# Patient Record
Sex: MALE | Race: WHITE | HISPANIC OR LATINO | Employment: OTHER | URBAN - METROPOLITAN AREA
[De-identification: names, ages, dates, MRNs, and addresses within clinical notes are randomized per-mention and may not be internally consistent; named-entity substitution may affect disease eponyms.]

---

## 2017-11-27 ENCOUNTER — OFFICE VISIT (OUTPATIENT)
Dept: URGENT CARE | Age: 67
End: 2017-11-27
Payer: COMMERCIAL

## 2017-11-27 ENCOUNTER — APPOINTMENT (OUTPATIENT)
Dept: RADIOLOGY | Age: 67
End: 2017-11-27
Payer: COMMERCIAL

## 2017-11-27 ENCOUNTER — TRANSCRIBE ORDERS (OUTPATIENT)
Dept: URGENT CARE | Age: 67
End: 2017-11-27

## 2017-11-27 DIAGNOSIS — J06.9 ACUTE UPPER RESPIRATORY INFECTION: ICD-10-CM

## 2017-11-27 DIAGNOSIS — E78.5 HYPERLIPIDEMIA: ICD-10-CM

## 2017-11-27 DIAGNOSIS — I10 ESSENTIAL (PRIMARY) HYPERTENSION: ICD-10-CM

## 2017-11-27 DIAGNOSIS — F02.80 ALZHEIMER'S DISEASE (HCC): ICD-10-CM

## 2017-11-27 DIAGNOSIS — R22.1 LOCALIZED SWELLING, MASS OR LUMP OF NECK: ICD-10-CM

## 2017-11-27 DIAGNOSIS — G81.91 HEMIPLEGIA AFFECTING RIGHT DOMINANT SIDE (HCC): ICD-10-CM

## 2017-11-27 DIAGNOSIS — E11.9 TYPE 2 DIABETES MELLITUS WITHOUT COMPLICATIONS (HCC): ICD-10-CM

## 2017-11-27 DIAGNOSIS — G30.9 ALZHEIMER'S DISEASE (HCC): ICD-10-CM

## 2017-11-27 DIAGNOSIS — F80.1 EXPRESSIVE LANGUAGE DISORDER: ICD-10-CM

## 2017-11-27 PROCEDURE — 99214 OFFICE O/P EST MOD 30 MIN: CPT | Performed by: FAMILY MEDICINE

## 2017-11-27 PROCEDURE — 71020 HB CHEST X-RAY 2VW FRONTAL&LATL: CPT

## 2017-11-27 PROCEDURE — G0463 HOSPITAL OUTPT CLINIC VISIT: HCPCS | Performed by: FAMILY MEDICINE

## 2017-11-28 ENCOUNTER — ALLSCRIPTS OFFICE VISIT (OUTPATIENT)
Dept: OTHER | Facility: OTHER | Age: 67
End: 2017-11-28

## 2017-11-29 ENCOUNTER — TRANSCRIBE ORDERS (OUTPATIENT)
Dept: ADMINISTRATIVE | Age: 67
End: 2017-11-29

## 2017-11-29 ENCOUNTER — APPOINTMENT (OUTPATIENT)
Dept: LAB | Age: 67
End: 2017-11-29
Payer: COMMERCIAL

## 2017-11-29 DIAGNOSIS — E11.9 TYPE 2 DIABETES MELLITUS WITHOUT COMPLICATIONS (HCC): ICD-10-CM

## 2017-11-29 DIAGNOSIS — G30.9 ALZHEIMER'S DISEASE (HCC): ICD-10-CM

## 2017-11-29 DIAGNOSIS — I10 ESSENTIAL (PRIMARY) HYPERTENSION: ICD-10-CM

## 2017-11-29 DIAGNOSIS — F02.80 ALZHEIMER'S DISEASE (HCC): ICD-10-CM

## 2017-11-29 DIAGNOSIS — E78.5 HYPERLIPIDEMIA: ICD-10-CM

## 2017-11-29 LAB
ALBUMIN SERPL BCP-MCNC: 3.7 G/DL (ref 3.5–5)
ALP SERPL-CCNC: 52 U/L (ref 46–116)
ALT SERPL W P-5'-P-CCNC: 21 U/L (ref 12–78)
ANION GAP SERPL CALCULATED.3IONS-SCNC: 6 MMOL/L (ref 4–13)
AST SERPL W P-5'-P-CCNC: 20 U/L (ref 5–45)
BASOPHILS # BLD AUTO: 0.01 THOUSANDS/ΜL (ref 0–0.1)
BASOPHILS NFR BLD AUTO: 0 % (ref 0–1)
BILIRUB SERPL-MCNC: 1.21 MG/DL (ref 0.2–1)
BUN SERPL-MCNC: 23 MG/DL (ref 5–25)
CALCIUM SERPL-MCNC: 9.2 MG/DL (ref 8.3–10.1)
CHLORIDE SERPL-SCNC: 105 MMOL/L (ref 100–108)
CHOLEST SERPL-MCNC: 99 MG/DL (ref 50–200)
CO2 SERPL-SCNC: 29 MMOL/L (ref 21–32)
CREAT SERPL-MCNC: 0.75 MG/DL (ref 0.6–1.3)
CREAT UR-MCNC: 190 MG/DL
EOSINOPHIL # BLD AUTO: 0.02 THOUSAND/ΜL (ref 0–0.61)
EOSINOPHIL NFR BLD AUTO: 0 % (ref 0–6)
ERYTHROCYTE [DISTWIDTH] IN BLOOD BY AUTOMATED COUNT: 13.5 % (ref 11.6–15.1)
EST. AVERAGE GLUCOSE BLD GHB EST-MCNC: 91 MG/DL
FOLATE SERPL-MCNC: >20 NG/ML (ref 3.1–17.5)
GFR SERPL CREATININE-BSD FRML MDRD: 94 ML/MIN/1.73SQ M
GLUCOSE P FAST SERPL-MCNC: 68 MG/DL (ref 65–99)
HBA1C MFR BLD: 4.8 % (ref 4.2–6.3)
HCT VFR BLD AUTO: 41.4 % (ref 36.5–49.3)
HDLC SERPL-MCNC: 41 MG/DL (ref 40–60)
HGB BLD-MCNC: 13.8 G/DL (ref 12–17)
LDLC SERPL CALC-MCNC: 40 MG/DL (ref 0–100)
LYMPHOCYTES # BLD AUTO: 0.75 THOUSANDS/ΜL (ref 0.6–4.47)
LYMPHOCYTES NFR BLD AUTO: 16 % (ref 14–44)
MCH RBC QN AUTO: 30.6 PG (ref 26.8–34.3)
MCHC RBC AUTO-ENTMCNC: 33.3 G/DL (ref 31.4–37.4)
MCV RBC AUTO: 92 FL (ref 82–98)
MICROALBUMIN UR-MCNC: 21.4 MG/L (ref 0–20)
MICROALBUMIN/CREAT 24H UR: 11 MG/G CREATININE (ref 0–30)
MONOCYTES # BLD AUTO: 0.67 THOUSAND/ΜL (ref 0.17–1.22)
MONOCYTES NFR BLD AUTO: 14 % (ref 4–12)
NEUTROPHILS # BLD AUTO: 3.18 THOUSANDS/ΜL (ref 1.85–7.62)
NEUTS SEG NFR BLD AUTO: 70 % (ref 43–75)
NRBC BLD AUTO-RTO: 0 /100 WBCS
PLATELET # BLD AUTO: 197 THOUSANDS/UL (ref 149–390)
PMV BLD AUTO: 10.8 FL (ref 8.9–12.7)
POTASSIUM SERPL-SCNC: 4 MMOL/L (ref 3.5–5.3)
PROT SERPL-MCNC: 7.1 G/DL (ref 6.4–8.2)
RBC # BLD AUTO: 4.51 MILLION/UL (ref 3.88–5.62)
SODIUM SERPL-SCNC: 140 MMOL/L (ref 136–145)
TRIGL SERPL-MCNC: 88 MG/DL
TSH SERPL DL<=0.05 MIU/L-ACNC: 1.54 UIU/ML (ref 0.36–3.74)
VIT B12 SERPL-MCNC: 937 PG/ML (ref 100–900)
WBC # BLD AUTO: 4.64 THOUSAND/UL (ref 4.31–10.16)

## 2017-11-29 PROCEDURE — 82746 ASSAY OF FOLIC ACID SERUM: CPT

## 2017-11-29 PROCEDURE — 82043 UR ALBUMIN QUANTITATIVE: CPT

## 2017-11-29 PROCEDURE — 84443 ASSAY THYROID STIM HORMONE: CPT

## 2017-11-29 PROCEDURE — 80053 COMPREHEN METABOLIC PANEL: CPT

## 2017-11-29 PROCEDURE — 85025 COMPLETE CBC W/AUTO DIFF WBC: CPT

## 2017-11-29 PROCEDURE — 82570 ASSAY OF URINE CREATININE: CPT

## 2017-11-29 PROCEDURE — 36415 COLL VENOUS BLD VENIPUNCTURE: CPT

## 2017-11-29 PROCEDURE — 82607 VITAMIN B-12: CPT

## 2017-11-29 PROCEDURE — 80061 LIPID PANEL: CPT

## 2017-11-29 PROCEDURE — 83036 HEMOGLOBIN GLYCOSYLATED A1C: CPT
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
## 2017-12-01 ENCOUNTER — GENERIC CONVERSION - ENCOUNTER (OUTPATIENT)
Dept: OTHER | Facility: OTHER | Age: 67
End: 2017-12-01
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
## 2017-12-07 ENCOUNTER — ALLSCRIPTS OFFICE VISIT (OUTPATIENT)
Dept: OTHER | Facility: OTHER | Age: 67
End: 2017-12-07

## 2017-12-13 ENCOUNTER — APPOINTMENT (OUTPATIENT)
Dept: PHYSICAL THERAPY | Facility: CLINIC | Age: 67
End: 2017-12-13
Payer: COMMERCIAL

## 2017-12-13 DIAGNOSIS — G81.91 HEMIPLEGIA AFFECTING RIGHT DOMINANT SIDE (HCC): ICD-10-CM

## 2017-12-13 PROCEDURE — G8979 MOBILITY GOAL STATUS: HCPCS

## 2017-12-13 PROCEDURE — G8978 MOBILITY CURRENT STATUS: HCPCS

## 2017-12-13 PROCEDURE — 97163 PT EVAL HIGH COMPLEX 45 MIN: CPT

## 2017-12-14 ENCOUNTER — APPOINTMENT (OUTPATIENT)
Dept: SPEECH THERAPY | Facility: CLINIC | Age: 67
End: 2017-12-14
Payer: COMMERCIAL

## 2017-12-15 ENCOUNTER — GENERIC CONVERSION - ENCOUNTER (OUTPATIENT)
Dept: INTERNAL MEDICINE CLINIC | Facility: CLINIC | Age: 67
End: 2017-12-15

## 2017-12-18 ENCOUNTER — GENERIC CONVERSION - ENCOUNTER (OUTPATIENT)
Dept: INTERNAL MEDICINE CLINIC | Facility: CLINIC | Age: 67
End: 2017-12-18

## 2017-12-19 ENCOUNTER — HOSPITAL ENCOUNTER (OUTPATIENT)
Dept: RADIOLOGY | Age: 67
Discharge: HOME | End: 2017-12-19
Payer: COMMERCIAL

## 2017-12-19 ENCOUNTER — ALLSCRIPTS OFFICE VISIT (OUTPATIENT)
Dept: OTHER | Facility: OTHER | Age: 67
End: 2017-12-19

## 2017-12-19 DIAGNOSIS — R22.1 LOCALIZED SWELLING, MASS OR LUMP OF NECK: ICD-10-CM

## 2017-12-19 PROCEDURE — 76705 ECHO EXAM OF ABDOMEN: CPT

## 2017-12-20 ENCOUNTER — GENERIC CONVERSION - ENCOUNTER (OUTPATIENT)
Dept: OTHER | Facility: OTHER | Age: 67
End: 2017-12-20

## 2017-12-20 ENCOUNTER — APPOINTMENT (OUTPATIENT)
Dept: PHYSICAL THERAPY | Facility: CLINIC | Age: 67
End: 2017-12-20
Payer: COMMERCIAL

## 2017-12-20 PROCEDURE — 97110 THERAPEUTIC EXERCISES: CPT

## 2017-12-20 PROCEDURE — 97112 NEUROMUSCULAR REEDUCATION: CPT

## 2017-12-21 ENCOUNTER — APPOINTMENT (OUTPATIENT)
Dept: PHYSICAL THERAPY | Facility: CLINIC | Age: 67
End: 2017-12-21
Payer: COMMERCIAL

## 2017-12-21 ENCOUNTER — GENERIC CONVERSION - ENCOUNTER (OUTPATIENT)
Dept: OTHER | Facility: OTHER | Age: 67
End: 2017-12-21

## 2017-12-21 PROCEDURE — 97110 THERAPEUTIC EXERCISES: CPT

## 2017-12-21 PROCEDURE — 97112 NEUROMUSCULAR REEDUCATION: CPT

## 2017-12-21 PROCEDURE — 97116 GAIT TRAINING THERAPY: CPT

## 2017-12-26 ENCOUNTER — APPOINTMENT (OUTPATIENT)
Dept: PHYSICAL THERAPY | Facility: CLINIC | Age: 67
End: 2017-12-26
Payer: COMMERCIAL

## 2017-12-26 ENCOUNTER — APPOINTMENT (OUTPATIENT)
Dept: OCCUPATIONAL THERAPY | Facility: CLINIC | Age: 67
End: 2017-12-26
Payer: COMMERCIAL

## 2017-12-26 PROCEDURE — 97112 NEUROMUSCULAR REEDUCATION: CPT

## 2017-12-26 PROCEDURE — 97110 THERAPEUTIC EXERCISES: CPT

## 2017-12-28 ENCOUNTER — APPOINTMENT (OUTPATIENT)
Dept: PHYSICAL THERAPY | Facility: CLINIC | Age: 67
End: 2017-12-28
Payer: COMMERCIAL

## 2017-12-28 ENCOUNTER — APPOINTMENT (OUTPATIENT)
Dept: OCCUPATIONAL THERAPY | Facility: CLINIC | Age: 67
End: 2017-12-28
Payer: COMMERCIAL

## 2017-12-28 PROCEDURE — 97112 NEUROMUSCULAR REEDUCATION: CPT

## 2017-12-28 PROCEDURE — 97110 THERAPEUTIC EXERCISES: CPT

## 2018-01-04 ENCOUNTER — APPOINTMENT (OUTPATIENT)
Dept: OCCUPATIONAL THERAPY | Facility: CLINIC | Age: 68
End: 2018-01-04
Payer: COMMERCIAL

## 2018-01-04 ENCOUNTER — APPOINTMENT (OUTPATIENT)
Dept: PHYSICAL THERAPY | Facility: CLINIC | Age: 68
End: 2018-01-04
Payer: COMMERCIAL

## 2018-01-04 ENCOUNTER — APPOINTMENT (OUTPATIENT)
Dept: SPEECH THERAPY | Facility: CLINIC | Age: 68
End: 2018-01-04
Payer: COMMERCIAL

## 2018-01-04 PROCEDURE — G8984 CARRY CURRENT STATUS: HCPCS

## 2018-01-04 PROCEDURE — 97167 OT EVAL HIGH COMPLEX 60 MIN: CPT

## 2018-01-04 PROCEDURE — G9163 LANG EXPRESS GOAL STATUS: HCPCS

## 2018-01-04 PROCEDURE — 92610 EVALUATE SWALLOWING FUNCTION: CPT

## 2018-01-04 PROCEDURE — G8985 CARRY GOAL STATUS: HCPCS

## 2018-01-04 PROCEDURE — G9162 LANG EXPRESS CURRENT STATUS: HCPCS

## 2018-01-04 PROCEDURE — 96105 ASSESSMENT OF APHASIA: CPT

## 2018-01-04 PROCEDURE — 97112 NEUROMUSCULAR REEDUCATION: CPT

## 2018-01-09 ENCOUNTER — GENERIC CONVERSION - ENCOUNTER (OUTPATIENT)
Dept: OTHER | Facility: OTHER | Age: 68
End: 2018-01-09

## 2018-01-09 ENCOUNTER — APPOINTMENT (OUTPATIENT)
Dept: PHYSICAL THERAPY | Facility: CLINIC | Age: 68
End: 2018-01-09
Payer: COMMERCIAL

## 2018-01-09 ENCOUNTER — APPOINTMENT (OUTPATIENT)
Dept: SPEECH THERAPY | Facility: CLINIC | Age: 68
End: 2018-01-09
Payer: COMMERCIAL

## 2018-01-09 ENCOUNTER — GENERIC CONVERSION - ENCOUNTER (OUTPATIENT)
Dept: INTERNAL MEDICINE CLINIC | Facility: CLINIC | Age: 68
End: 2018-01-09

## 2018-01-09 PROCEDURE — 97164 PT RE-EVAL EST PLAN CARE: CPT

## 2018-01-09 PROCEDURE — 92507 TX SP LANG VOICE COMM INDIV: CPT

## 2018-01-09 PROCEDURE — G8979 MOBILITY GOAL STATUS: HCPCS

## 2018-01-09 PROCEDURE — G8978 MOBILITY CURRENT STATUS: HCPCS

## 2018-01-11 ENCOUNTER — APPOINTMENT (OUTPATIENT)
Dept: PHYSICAL THERAPY | Facility: CLINIC | Age: 68
End: 2018-01-11
Payer: COMMERCIAL

## 2018-01-11 ENCOUNTER — APPOINTMENT (OUTPATIENT)
Dept: SPEECH THERAPY | Facility: CLINIC | Age: 68
End: 2018-01-11
Payer: COMMERCIAL

## 2018-01-11 PROCEDURE — 97112 NEUROMUSCULAR REEDUCATION: CPT

## 2018-01-11 PROCEDURE — 92526 ORAL FUNCTION THERAPY: CPT

## 2018-01-11 PROCEDURE — 92507 TX SP LANG VOICE COMM INDIV: CPT

## 2018-01-11 NOTE — MISCELLANEOUS
Reason For Visit  Reason For Visit Free Text Note Form: Assistance with obtaining nutritional supplement and community resources     Case Management Documentation [facility]:   Information obtained from the patient, family member(s) and medical record Dgt , grand dgt in law Swapna  He is also dealing with additional issues such as language/communication barrier, homelessness, chronic/terminal disease and CVA/ Alzheimers/ DM  Patient is participating in [employer] and [employer] programs  Action Plan: supportive counseling/advocacy, information provided and Financial Counselor/ [facility]  plan reviewed  Progress Note  JOHN has met with this 70 y/o male pt his dgt , great dgt in [facility] [id number] [facility] [facility]  Dgt in law provided translation as per pt/ family request  Pt has moved to [place] from [place] due tho the [facility]  Pt has Alzheimer's Disease , s/p CVA and has all nutrition through a feeding tube  JOHN assisted with call to [facility] and spoke to [employer] [phone]  He relates that's present insurance [employer] [facility]) does not have any out of [place] benefits  He did quote cash price for requires feedings (30 day supply $ 678 50 or 1 week supply $ 141 61 and $16 00 for a syringe)  He will investigate if there are any programs that can help lower the cost and call family directly  JOHN assisted pt /family with call to the [facility] customer service # to help him apply for [place]  Family relates they will try to enroll pt is a PA program  They would like Julius [employer] Jan [employer]OPEZ provided # for same  JONH also provided family with# for our Financial counselor and encouraged him to apply for [employer]  Pt is now living with his Aleida Jensen and their family in a 2 floor cramped home  Pt needs to locate a first floor and or handicapped assessable apt  JOHN provided family with several lists and housings applications to assist in the application process      Elaine Del Angel also discussed the PA WAIver program with family as dgt has quit working to care for her very debilitated father  JOHN will request provider to complete medical certification for same and will fax it in when available  In addition, JOHN provided pt/ family with a [employer] application to complete  SW to remain available to support and assist as indicated  Active Problems    1  Alzheimer's disease (331 0) (G30 9)   2  Diabetes (250 00) (E11 9)   3  Diabetes mellitus type 2, noninsulin dependent (250 00) (E11 9)   4  Dysphagia (787 20) (R13 10)   5  Expressive speech delay (315 31) (F80 1)   6  Feeding difficulties (783 3) (R63 3)   7  Hemiparesis, right (342 90) (G81 91)   8  Hyperlipidemia (272 4) (E78 5)   9  Hypertension (401 9) (I10)   10  Upper respiratory infection with cough and congestion (465 9) (J06 9)   11  Uses feeding tube (V49 89) (Z78 9)    Current Meds   1  Coreg TABS; Therapy: (Recorded:27Nov2017) to Recorded   2  DilTIAZem  MG Oral Capsule Extended Release 24 Hour; Therapy: (Recorded:27Nov2017) to Recorded   3  Glimepiride TABS; Therapy: (Recorded:27Nov2017) to Recorded   4  Glucerna 1 2 Flash Oral Liquid; USE AS DIRECTED; Therapy: 25QHP7920 to (Last 781 1704)  Requested for: [phone] Ordered   5  Glucerna LIQD;   Therapy: (Recorded:27Nov2017) to Recorded   6  LevoFLOXacin 25 MG/ML Intravenous Solution; Take 30mL once daily for 7 days; Therapy: 04ZVY3936 to (Last 781 1704)  Requested for: [phone] Ordered   7  Losartan Potassium TABS; Therapy: (Recorded:27Nov2017) to Recorded   8  Memantine HCl TABS; Therapy: ([id number]) to Recorded   9  Zocor TABS; Therapy: (Recorded:27Nov2017) to Recorded    Allergies    1   No Known Drug Allergies    Results/Data  PHQ-2 Adult Depression Screening 28Nov2017 01:05PM User, Ahs     Test Name Result Flag Reference   PHQ-2 Adult Depression Score 0     Over the last two weeks, how often have you been bothered by any of the following problems? Little interest or pleasure in doing things: Not at all - 0  Feeling down, depressed, or hopeless: Not at all - 0   PHQ-2 Adult Depression Screening Negative         Assessment    1  Diabetes (250 00) (E11 9)   2  Hyperlipidemia (272 4) (E78 5)   3  Hypertension (401 9) (I10)   4  Alzheimer's disease (331 0) (G30 9)   5  Hemiparesis, right (342 90) (G81 91)   6  Expressive speech delay (315 31) (F80 1)    Plan    1  From  Memantine HCl TABS  To Memantine HCl - 10 MG Oral Tablet   (Namenda) Take 1 tablet daily   2  (1) CBC/PLT/DIFF; Status:Active; Requested for:28Nov2017;    3  (1) FOLATE; Status:Active; Requested for:28Nov2017;    4  (1) TSH WITH FT4 REFLEX; Status:Active; Requested for:28Nov2017;    5  (1) VITAMIN B12; Status:Active; Requested for:28Nov2017;    6  *1 -  NEUROLOGY Co-Management  *  Status: Active  Requested for: 65XSI7181  Care Summary provided  : Yes    7  (1) HEMOGLOBIN A1C; Status:Active; Requested for:28Nov2017;    8  (1) MICROALBUMIN CREATININE RATIO, RANDOM URINE; Status:Active; Requested   for:28Nov2017;     9  Glimepiride 4 MG Oral Tablet; TAKE 1 TABLET DAILY AS DIRECTED    10  *1 -  SPEECH THERAPY Co-Management  *  Status: Active  Requested for:    22IRC5119  Care Summary provided  : Yes    11  *1 -  Physical Therapy Co-Management  *  Status: Active  Requested for: 70KXI5043  Care Summary provided  : Yes    12  Simvastatin 40 MG Oral Tablet; TAKE 1 TABLET AT BEDTIME   13  (1) LIPID PANEL FASTING W DIRECT LDL REFLEX; Status:Active; Requested    for:28Nov2017;     14  Carvedilol 12 5 MG Oral Tablet; Take 1 tablet daily   15  DilTIAZem  MG Oral Capsule Extended Release 24 Hour; take 1 capsule    daily   16  Losartan Potassium 100 MG Oral Tablet; TAKE 1 TABLET DAILY   17  (1) COMPREHENSIVE METABOLIC PANEL; Status:Active;  Requested for:28Nov2017;     18  Glucerna 1 2 Flash Oral Liquid; Drink 6-7 cans daily    Signatures   Electronically signed by : Cristina Oconnor Fabiola Connolly; Nov 28 2017  5:06PM EST                       (Author)

## 2018-01-12 ENCOUNTER — GENERIC CONVERSION - ENCOUNTER (OUTPATIENT)
Dept: INTERNAL MEDICINE CLINIC | Facility: CLINIC | Age: 68
End: 2018-01-12

## 2018-01-15 ENCOUNTER — GENERIC CONVERSION - ENCOUNTER (OUTPATIENT)
Dept: INTERNAL MEDICINE CLINIC | Facility: CLINIC | Age: 68
End: 2018-01-15

## 2018-01-15 VITALS
BODY MASS INDEX: 23.52 KG/M2 | HEIGHT: 68 IN | HEART RATE: 62 BPM | SYSTOLIC BLOOD PRESSURE: 118 MMHG | WEIGHT: 155.2 LBS | DIASTOLIC BLOOD PRESSURE: 70 MMHG | TEMPERATURE: 99 F

## 2018-01-16 ENCOUNTER — APPOINTMENT (OUTPATIENT)
Dept: SPEECH THERAPY | Facility: CLINIC | Age: 68
End: 2018-01-16
Payer: COMMERCIAL

## 2018-01-16 ENCOUNTER — APPOINTMENT (OUTPATIENT)
Dept: PHYSICAL THERAPY | Facility: CLINIC | Age: 68
End: 2018-01-16
Payer: COMMERCIAL

## 2018-01-16 PROCEDURE — 97112 NEUROMUSCULAR REEDUCATION: CPT

## 2018-01-16 PROCEDURE — 97116 GAIT TRAINING THERAPY: CPT

## 2018-01-16 PROCEDURE — 92507 TX SP LANG VOICE COMM INDIV: CPT

## 2018-01-16 PROCEDURE — 97110 THERAPEUTIC EXERCISES: CPT

## 2018-01-17 ENCOUNTER — TRANSCRIBE ORDERS (OUTPATIENT)
Dept: ADMINISTRATIVE | Facility: HOSPITAL | Age: 68
End: 2018-01-17

## 2018-01-17 ENCOUNTER — ALLSCRIPTS OFFICE VISIT (OUTPATIENT)
Dept: OTHER | Facility: OTHER | Age: 68
End: 2018-01-17

## 2018-01-17 ENCOUNTER — GENERIC CONVERSION - ENCOUNTER (OUTPATIENT)
Dept: OTHER | Facility: OTHER | Age: 68
End: 2018-01-17

## 2018-01-17 DIAGNOSIS — R47.01 APHASIA: Primary | ICD-10-CM

## 2018-01-17 DIAGNOSIS — R47.01 APHASIA: ICD-10-CM

## 2018-01-17 DIAGNOSIS — G51.0 BELL'S PALSY: ICD-10-CM

## 2018-01-18 ENCOUNTER — APPOINTMENT (OUTPATIENT)
Dept: SPEECH THERAPY | Facility: CLINIC | Age: 68
End: 2018-01-18
Payer: COMMERCIAL

## 2018-01-18 PROCEDURE — 92526 ORAL FUNCTION THERAPY: CPT

## 2018-01-18 PROCEDURE — 92507 TX SP LANG VOICE COMM INDIV: CPT

## 2018-01-23 ENCOUNTER — APPOINTMENT (OUTPATIENT)
Dept: SPEECH THERAPY | Facility: CLINIC | Age: 68
End: 2018-01-23
Payer: COMMERCIAL

## 2018-01-23 ENCOUNTER — APPOINTMENT (OUTPATIENT)
Dept: PHYSICAL THERAPY | Facility: CLINIC | Age: 68
End: 2018-01-23
Payer: COMMERCIAL

## 2018-01-23 ENCOUNTER — APPOINTMENT (OUTPATIENT)
Dept: OCCUPATIONAL THERAPY | Facility: CLINIC | Age: 68
End: 2018-01-23
Payer: COMMERCIAL

## 2018-01-23 VITALS — DIASTOLIC BLOOD PRESSURE: 62 MMHG | HEART RATE: 68 BPM | SYSTOLIC BLOOD PRESSURE: 100 MMHG | TEMPERATURE: 97.4 F

## 2018-01-23 VITALS
SYSTOLIC BLOOD PRESSURE: 80 MMHG | BODY MASS INDEX: 23.46 KG/M2 | HEART RATE: 58 BPM | WEIGHT: 154.76 LBS | HEIGHT: 68 IN | DIASTOLIC BLOOD PRESSURE: 62 MMHG | TEMPERATURE: 98.6 F

## 2018-01-23 PROCEDURE — 97112 NEUROMUSCULAR REEDUCATION: CPT

## 2018-01-23 PROCEDURE — 97110 THERAPEUTIC EXERCISES: CPT

## 2018-01-23 PROCEDURE — 92507 TX SP LANG VOICE COMM INDIV: CPT

## 2018-01-23 PROCEDURE — 97116 GAIT TRAINING THERAPY: CPT

## 2018-01-23 PROCEDURE — 97140 MANUAL THERAPY 1/> REGIONS: CPT

## 2018-01-23 NOTE — PROGRESS NOTES
Recorded as Task  Date: 11/29/2017 03:26 PM, Created By: Steven Barros  Task Name: Care Coordination  Assigned To: Vincent Quintana  Regarding Patient: Arabella Martinez, Status: Active   [facility] - 29 Nov 2017 3:26 PM    TASK CREATED  Can you please call this patient's family and find out if has been on any supplement for vit B12 or folate by chance? thanks  José Antonio Morgan - 24 Dec 2017 4:06 PM     TASK EDITED  Called patient's family and was adviced that patient is not taking any B12 supplement or Folate

## 2018-01-23 NOTE — PROGRESS NOTES
Recorded as Task  Date: 12/19/2017 04:34 PM, Created By: Arely Madera  Task Name: Care Coordination  Assigned To: Vincent Quintana  Regarding Patient: Luis Vasquez, Status: Active   CommentLoprincess Staci - 19 Dec 2017 4:34 PM    TASK CREATED  please inform patient that the lump on his left flank is a lipoma  thanks  Amanda Mensah - 20 Dec 2017 1:08 PM    TASK REASSIGNED: Previously Assigned To Sugey Mcintyre - 20 Dec 2017 1:17 PM    TASK EDITED  I SPOKE WITH PT ABIGAIL WILEY AND HAS BEEN INFORMED,  AND SAURABH STATE THAT PT HAS A NEW LUMP ON THE LEFT SIDE OF PT NECK  Cintia Luciano - 20 Dec 2017 1:18 PM    TASK REASSIGNED:  Previously Assigned To Devaughn Hidalgo - 20 Dec 2017 2:13 PM    TASK REPLIED TO: Previously Assigned To Vincent Quintana  If this lump is diffrerent from the ones they showed us during their last visit, they should come in so we can  look at it  thanks  Lisa Monge - 20 Dec 2017 4:10 PM    TASK EDITED  I SPOKE WITH SAURABH PT ABIGAIL AND SHE SAID THAT THE LUMPT IS SAMALL AND RED BUT SHE IS GOING TO [facility] [place] TO SEE IT AND MAKE SURE AND THEM CALL US BACK  Cintia Luciano - 20 Dec 2017 4:11 PM    TASK REASSIGNED: Previously Assigned To [facility]

## 2018-01-23 NOTE — MISCELLANEOUS
Reason For Visit  Reason For Visit Free Text Note Form: Assistance with obtaining Insurance  Case Management Documentation [facility]:   Information obtained from the patient and family member(s) Dgt and Grandson  Patient's financial status inadequate medical insurance  He is also dealing with additional issues such as language/communication barrier and chronic/terminal disease  Patient is participating in Need to switch from [address] [facility] programs  Action Plan: information provided  plan reviewed  Progress Note  JOHN met with pt , his dgt and grand son this date as their request for assistance with Insurance Information  Interpretation was provided by James Varmat related his [place] Medicare from [facility] [place] is planning  SW informed pt/family that SW can not advise or recommend any particular plans  General info provided on types of coverage  Traditional Medicare with [employer] and Medicare Advantage Plans  Dgt is interested in [facility] plans  JOHN assisted pt with checking for # on the Internet and provided phone for their call to [facility]  They then spoke with Insurance plans   Family dentes need of other assistance at this time  Sw to remain available to assist as indicted,  Active Problems    1  Alzheimer's disease (331 0) (G30 9)   2  Cough (786 2) (R05)   3  Diabetes mellitus type 2, noninsulin dependent (250 00) (E11 9)   4  Dysphagia (787 20) (R13 10)   5  Expressive speech delay (315 31) (F80 1)   6  Feeding difficulties (783 3) (R63 3)   7  Hemiparesis, right (342 90) (G81 91)   8  Hyperlipidemia (272 4) (E78 5)   9  Hypertension (401 9) (I10)   10  Localized swelling, mass or lump of neck (784 2) (R22 1)   11  Need for immunization against influenza (V04 81) (Z23)   12  Toenail fungus (110 1) (B35 1)   13  Upper respiratory infection with cough and congestion (465 9) (J06 9)   14  Uses feeding tube (V49 89) (Z78 9)    Current Meds   1  Carvedilol 12 5 MG Oral Tablet;  Take 1 tablet daily  Requested for: 84GAV5942; Last   Rx:28Nov2017 Ordered   2  DilTIAZem  MG Oral Capsule Extended Release 24 Hour; take 1 capsule daily    Requested for: 28Nov2017; Last Rx:28Nov2017 Ordered   3  Glimepiride 4 MG Oral Tablet; TAKE 1 TABLET DAILY AS DIRECTED  Requested for:   19TLJ6755; Last Rx:28Nov2017 Ordered   4  Glucerna 1 2 Flash Oral Liquid; Drink 6-7 cans daily; Therapy: 48JSB3770 to (Last Rx:28Nov2017)  Requested for: 28Nov2017 Ordered   5  Glucerna 1 2 Flash Oral Liquid; USE AS DIRECTED; Therapy: 37EIQ4302 to (Last 171 9404)  Requested for: [phone] Ordered   6  Glucerna LIQD;   Therapy: (Recorded:27Nov2017) to Recorded   7  Losartan Potassium 100 MG Oral Tablet; TAKE 1 TABLET DAILY  Requested for:   98RYH0276; Last Rx:28Nov2017 Ordered   8  Memantine HCl - 10 MG Oral Tablet (Namenda); Take 1 tablet daily  Requested for:   05LVM1679; Last Rx:30Nov2017 Ordered   9  Simvastatin 40 MG Oral Tablet; TAKE 1 TABLET AT BEDTIME  Requested for: 47PKP4881;   Last Rx:28Nov2017 Ordered    Allergies    1  No Known Drug Allergies    Assessment    1  Toenail fungus (110 1) (B35 1)   2  Hypertension (401 9) (I10)   3  Alzheimer's disease (331 0) (G30 9)   4  Diabetes mellitus type 2, noninsulin dependent (250 00) (E11 9)   5  Dysphagia (787 20) (R13 10)   6  Hemiparesis, right (342 90) (G81 91)    Plan    1  *1 - [facility]  *  Status: Hold For - Scheduling  Requested   for: 52RFN0969  Care Summary provided  : Yes    2  2 Jameson Valdez MD, [facility] Co-Management  *  Status: Hold For -   Scheduling  Requested for: 47TKD4279  Care Summary provided  : Yes   3  *VB - Foot Exam; Status:Complete;   Done: 09XBF8313    4  Glimepiride 4 MG Oral Tablet    5  *1 - [facility]  *  Status: Hold For -   Scheduling  Requested for: 43QKA4829  Care Summary provided  : Yes    6   Losartan Potassium 100 MG Oral Tablet    7  US SUPERFICIAL LUMP (NON EXTREMITY); Status:Hold For - Scheduling; Requested   for:38Dqj3289;     8  Flulaval Quadrivalent Intramuscular Suspension; INJECT 0 5  ML   Intramuscular; To Be Done: 98XHN9799    9  *1 -  CLINIC PODIATRY Co-Management  *  Status: Hold For - Scheduling  Requested   for: 24PPW3567  Care Summary provided  : Yes    Future Appointments    Date/Time Provider Specialty Site   12/19/2017 10:30 AM [facility], [address] [place] [address]   01/02/2018 10:00 AM FADIA Phan   Neurology [facility]     Signatures   Electronically signed by : Stanton Lemus LCSW; Dec  7 2017  2:46PM EST                       (Author)

## 2018-01-23 NOTE — PROGRESS NOTES
Recorded as Task  Date: 01/08/2018 09:12 AM, Created By: Percy Bukcley  Task Name: Follow Up  Assigned To: Vincent Quintana  Regarding Patient: Judi Bronson, Status: Active   [facility] - 08 Jan 2018 9:12 AM    TASK CREATED  Other  Millicent from [facility] called regarding the VNA order you placed on 12/7/17 she is asking what discipline is needed, the order needs to be more specific, is the patient  on a feeding tube, does he requires physical therapy something like that, please update this order once the order is done please let me know so that I can call her at [phone], thank you  Vincent Quintana - 08 Jan 2018 10:15 AM    TASK REPLIED TO:  Previously Assigned To Kera Berger  he has hemiparesis on the left side, and uses a feeding tube  The main thing will be for them to help with the feeding tube  thanks  Marco Mancuso - 08 Jan 2018 3:31 PM    TASK REPLIED TO: Previously  Assigned To Percy Buckley  please add a new order indicating this information and then let me know so that I may call vna thanks  Kera Berger - 08 Jan 2018 10:30 PM    TASK REPLIED TO: Previously Assigned To Kera Convo  i have added the  information in the order  thanks   Marco Mancuso - 09 Jan 2018 9:06 AM    TASK EDITED  Percy Buckley - 09 Jan 2018 9:06 AM    TASK COMPLETED  Kady Sharma - 09 Jan 2018 9:57 AM    TASK REACTIVATED  Kady Sharma -  09 Jan 2018 10:02 AM    TASK EDITED  Angie Thomas vna [phone] called to state that they do not do tube feeding  they are not taking the patient  -    since patient is going to outpatient services  speech therapy and  physical therapy and is  not home bound    hospital bed can be initiated by you and send to a [employer] company

## 2018-01-23 NOTE — PROGRESS NOTES
Assessment    1  Diabetes mellitus type 2, noninsulin dependent (250 00) (E11 9)   2  Toenail fungus (110 1) (B35 1)    Plan    · Clotrimazole 1 % External Solution; APPLY AND GENTLY MASSAGE INTO  AFFECTED AREA(S) TWICE DAILY   · Follow-up visit in 1 year Evaluation and Treatment  Follow-up  Status: Hold For -  Scheduling  Requested for: 81QIL6356    Discussion/Summary    20-year-old diabetic male presents to clinic for mycotic nails  -patient seen and examined  -discuss possible treatment options for fungal toenails including oral and topical medications  Given the patient is on statin, will try with topical medication  Family in agreement with plan  -Rx clotrimazole 1% solution  -discussed importance of proper shoe gear and diabetic foot care  -RTC 1 year, unless other complaints right     History of Present Illness  HPI: 68 y/o DM M presents to clinic fungal toenails and diabetic risk assessment  Patient ambulates to clinic in wheelchair and sneakers  Patient is accompanied with family who states that he is a diabetic however due to GI problems and poor nutrition he has stopped taking diabetic meds per PCP as A1c was too low (4 8)  Patient is here for his fungal toenails is wondering if he can get medication for the nails  Patient is noted complaints  Patient walks minimally, usually only at home  Denies constitutional symptoms       Hospital Based Practices Required Assessment:   Pain Assessment   the patient states they do not have pain  (on a scale of 0 to 10, the patient rates the pain at 0 )    Prefered Language is  Jamaican  Primary Language is  Jamaican  Review of Systems    Constitutional: as noted in HPI    ENT: as noted in HPI  Cardiovascular: as noted in HPI  Respiratory: as noted in HPI  Gastrointestinal: as noted in HPI  Genitourinary: as noted in HPI  Musculoskeletal: as noted in HPI  Integumentary: as noted in HPI  Neurological: as noted in HPI  ROS reviewed        Active Problems    1  Alzheimer's disease (331 0) (G30 9)   2  Cough (786 2) (R05)   3  Diabetes mellitus type 2, noninsulin dependent (250 00) (E11 9)   4  Dysphagia (787 20) (R13 10)   5  Expressive speech delay (315 31) (F80 1)   6  Feeding difficulties (783 3) (R63 3)   7  Hemiparesis, right (342 90) (G81 91)   8  Hyperlipidemia (272 4) (E78 5)   9  Hypertension (401 9) (I10)   10  Localized swelling, mass or lump of neck (784 2) (R22 1)   11  Need for immunization against influenza (V04 81) (Z23)   12  Toenail fungus (110 1) (B35 1)   13  Upper respiratory infection with cough and congestion (465 9) (J06 9)   14  Uses feeding tube (V49 89) (Z78 9)    Past Medical History    · History of stroke (V12 54) (Z86 73)    The active problems and past medical history were reviewed and updated today  Surgical History    · History of Feeding Tube    The surgical history was reviewed and updated today  Family History  Father    · Family history of diabetes mellitus (V18 0) (Z83 3)   · Family history of hypertension (V17 49) (Z82 49)  Brother    · Family history of diabetes mellitus (V18 0) (Z83 3)   · Family history of hypertension (V17 49) (Z82 49)    The family history was reviewed and updated today  Social History    · Never a smoker  The social history was reviewed and updated today  Current Meds   1  Carvedilol 12 5 MG Oral Tablet; Take 1 tablet daily  Requested for: 28Nov2017; Last   Rx:28Nov2017 Ordered   2  DilTIAZem  MG Oral Capsule Extended Release 24 Hour; take 1 capsule daily    Requested for: 28Nov2017; Last Rx:28Nov2017 Ordered   3  Glucerna 1 2 Flash Oral Liquid; Drink 6-7 cans daily; Therapy: 78IVU2263 to (Last Rx:28Nov2017)  Requested for: 28Nov2017 Ordered   4  Glucerna 1 2 Flash Oral Liquid; USE AS DIRECTED; Therapy: 58VRN2456 to (Last [phone])  Requested for: [phone] Ordered   5  Glucerna LIQD;   Therapy: (Recorded:27Nov2017) to Recorded   6   Memantine HCl - 10 MG Oral Tablet; Take 1 tablet daily  Requested for: 65JTD6567; Last   Rx:30Nov2017 Ordered   7  Simvastatin 40 MG Oral Tablet; TAKE 1 TABLET AT BEDTIME  Requested for: 16UQD9152;   Last Rx:28Nov2017 Ordered    The medication list was reviewed and updated today  Allergies    1  No Known Drug Allergies    Vitals   Recorded: 52DRT5049 10:25AM   Temperature 97 4 F   Heart Rate 68   Systolic 754   Diastolic 62   Height Unobtainable Yes   Weight Unobtainable Yes     Physical Exam    Constitutional: no acute distress, well appearing and well nourished  Pulmonary: no labored breathing and no wheezing  Cardiovascular: DP 2/4 bilateral, PT 1/4 bilateral  Cap refill time less than 3 seconds  Temperature gradient warm to cool  Orthopedic/Biomechanical: Manual muscle testing 5/5 bilateral  With no pain upon active and passive range of motion  No tenderness to calves  Skin: Open lesions, no clinical signs of infection, no edema, no erythema  No maceration interspaces  Mildly elongated, dystrophic/mycotic nails x10  Neurologic: Grossly intact  Psychiatric: oriented to person, place, and time  Future Appointments    Date/Time Provider Specialty Site   01/02/2018 10:00 AM FADIA Holden   Neurology [address]     Signatures   Electronically signed by : Pollo Brunner DPM; Dec 19 2017 10:58AM EST                       (Author)

## 2018-01-24 VITALS
DIASTOLIC BLOOD PRESSURE: 60 MMHG | SYSTOLIC BLOOD PRESSURE: 90 MMHG | HEART RATE: 60 BPM | WEIGHT: 154.76 LBS | HEIGHT: 68 IN | TEMPERATURE: 98.3 F | BODY MASS INDEX: 23.46 KG/M2

## 2018-01-24 VITALS
OXYGEN SATURATION: 97 % | HEART RATE: 66 BPM | DIASTOLIC BLOOD PRESSURE: 66 MMHG | HEIGHT: 68 IN | WEIGHT: 151.19 LBS | SYSTOLIC BLOOD PRESSURE: 102 MMHG | BODY MASS INDEX: 22.91 KG/M2 | RESPIRATION RATE: 16 BRPM

## 2018-01-25 ENCOUNTER — OFFICE VISIT (OUTPATIENT)
Dept: OCCUPATIONAL THERAPY | Facility: CLINIC | Age: 68
End: 2018-01-25
Payer: COMMERCIAL

## 2018-01-25 ENCOUNTER — LAB (OUTPATIENT)
Dept: LAB | Facility: CLINIC | Age: 68
End: 2018-01-25
Payer: COMMERCIAL

## 2018-01-25 ENCOUNTER — OFFICE VISIT (OUTPATIENT)
Dept: PHYSICAL THERAPY | Facility: CLINIC | Age: 68
End: 2018-01-25
Payer: COMMERCIAL

## 2018-01-25 ENCOUNTER — OFFICE VISIT (OUTPATIENT)
Dept: SPEECH THERAPY | Facility: CLINIC | Age: 68
End: 2018-01-25
Payer: COMMERCIAL

## 2018-01-25 DIAGNOSIS — G81.91 RIGHT HEMIPARESIS (HCC): Primary | ICD-10-CM

## 2018-01-25 DIAGNOSIS — G51.0 BELL'S PALSY: ICD-10-CM

## 2018-01-25 DIAGNOSIS — I63.9 CEREBROVASCULAR ACCIDENT (CVA), UNSPECIFIED MECHANISM (HCC): Primary | ICD-10-CM

## 2018-01-25 DIAGNOSIS — I69.391 DYSPHAGIA STATUS POST CEREBROVASCULAR ACCIDENT: Primary | ICD-10-CM

## 2018-01-25 PROCEDURE — 83519 RIA NONANTIBODY: CPT

## 2018-01-25 PROCEDURE — 97140 MANUAL THERAPY 1/> REGIONS: CPT

## 2018-01-25 PROCEDURE — 83516 IMMUNOASSAY NONANTIBODY: CPT

## 2018-01-25 PROCEDURE — 92526 ORAL FUNCTION THERAPY: CPT

## 2018-01-25 PROCEDURE — 84238 ASSAY NONENDOCRINE RECEPTOR: CPT

## 2018-01-25 PROCEDURE — 97110 THERAPEUTIC EXERCISES: CPT

## 2018-01-25 PROCEDURE — 92507 TX SP LANG VOICE COMM INDIV: CPT

## 2018-01-25 PROCEDURE — 97112 NEUROMUSCULAR REEDUCATION: CPT

## 2018-01-25 PROCEDURE — 97116 GAIT TRAINING THERAPY: CPT

## 2018-01-25 PROCEDURE — 36415 COLL VENOUS BLD VENIPUNCTURE: CPT

## 2018-01-25 NOTE — PROGRESS NOTES
Daily Note     Today's date: 2018  Patient name: Chyna Paige  : 1950  MRN: [id number]  Referring provider: LUCIA Adams  Dx:   Encounter Diagnosis   Name Primary?  Cerebrovascular accident (CVA), unspecified mechanism ([facility] [address] ) Yes                  Subjective: Pt gestured "thumbs up"  Objective: See treatment diary below      Assessment: Patient tolerated BIONESS on RUE neuromod for 10min while performing AAROM  While supine on mat, pt completed 1# tbar forward presses, 10X3  While supine on mat, pt performed FF of RUE, with grasp/release of bean bags, with focus on proximal strengthening and stabilization  Massager was used on latissimus dorsi to facilitate tone while simultaneously AAROM into FF RUE, pt tolerated well  Improved pt affect with gesture of thumbs up post session   natalya used to improve communication with pt due to language barrier  Due to fatigue, session was ended 15 minutes early  Plan: Continued skilled OT per POC

## 2018-01-25 NOTE — PROGRESS NOTES
Daily Note     Today's date: 2018  Patient name: Tati Richardson  : 1950  MRN: [id number]  Referring provider: LUCIA Schmidt  Dx:   Encounter Diagnosis   Name Primary?  Right hemiparesis (HCC) Yes                  Subjective: No complaints prior to session  Objective: See treatment diary below  Daily Treatment Diary     Manual                                                     Exercise Diary  18       nustep 10 min       Step ups-6'' 10x       Alt step taps-6'' 10x       Side stepping 1 1/2 laps       Stepping over weights as hurdles 3 laps       Gait training-NBQC [id number]                                                                                                                           Modalities                                          Assessment: Tolerated treatment fair  Utilized wrap to promote R DF and avoid R knee hyperextension, with some improvement  Patient did have difficulty keeping wrap on  Impression of more difficulty comprehending exercises today  Educated patient in step through gait pattern, as he continues to ambulate with step to  One LOB requiring therapist assistance to correct  Patient could benefit from continued PT      Plan: Continue per plan of care

## 2018-01-25 NOTE — PROGRESS NOTES
Daily Speech Treatment Note    Primary Diagnosis/Billing code: I69 230, I69 391  Secondary Diagnosis/ Billing code: I63 9    Visit Tracking:  -Visit #6  -CBC  ($30 co-pay - AUTH REQ)  -RE due 2/4/18  Subjective/Behavioral: Pt was attentive and participated well during session  Objective/Assessment:  -Patient's family member/caregiver was present during today's session   -Pt did not complete HEP, daughter reported she had worksheet in her purse and forgot to give it to pt  Goal 1 (Patient will produce sound approximations in 80% of opp given max cueing ):  (to be achieved in 4-6 weeks)-Targeted  Pt was asked to open mouth and say /ah/ he was able to open mouth 3x and grunted while attempting to say /ahh/  daughter reported he was able to say /a/ 1x at home during practice  Goal 2 (Patient will correctly identify a related phrase or sentence when given a picture in 80% of opps  ):  (to be achieved in 4-6 weeks)    Goal 3 (Patient will write words correctly in 80% of opps when verbally presented ):  (to be achieved in 4-6 weeks)-Targeted    Clinician utilized iPad note  Pt was asked to write words verbally presented (e g  Darreld Can, name, house) he did so correctly in 8/10 opp  He required minimal verbal cues during task  Pt was then asked to provide an associated word to a given word  (horse and___) he was able to complete task with 79% accuracy  He required minimal verbal cues during task  Pt was then asked to choose a similar  word to a given word   (e g  fix: repair, break, sun) Task completed with 80% accuracy  He required minimal verbal cues during task  Clinician read words to pt during task       Goal 4: (Patient will write the name of a visually presented object in 80% of opps ) (to be achieved in 4-6 weeks)    Goal 5: (Patient will participate in further assessment of AAC use) (to be achieved in 4-6 weeks)    Goal 6: (Patient will match picture to word out of a field of 10 ) (to be achieved in 4-6 weeks)  Pt was     Goal 7: (Patient will improve initiation of swallow trigger with use of gustatory/TTS to less then 5 secs )  (to be achieved in 4-6 weeks)    Goal 8: (Patient will improve oral responsiveness/volitional movement of oral structures with use of gusttory/TTS in 50% of opps ) (to be achieved in 4-6 weeks)-Targeted    Initiated oral massage for stimulation  Massaged areas of the masseter and buccinatory muscles for 10 minutes  Pt was able to tolerate massage  Pt was then asked to open and close mouth, he did so 10x given clinician model  Pt noted to have more control of opening and closing mouth today, his drooling has also decreased greatly  Plan:  -Patient was provided with home exercises/activities to target goals in plan of care at the end of today's session

## 2018-01-29 LAB
COMMENTS (MUSK): NORMAL
GENE DIS ANL CARRIER INTERP-IMP: NORMAL
INTERPRETATION (MUSK): NORMAL
Lab: NORMAL
METHODS (MUSK): NORMAL

## 2018-01-30 ENCOUNTER — OFFICE VISIT (OUTPATIENT)
Dept: OCCUPATIONAL THERAPY | Facility: CLINIC | Age: 68
End: 2018-01-30
Payer: COMMERCIAL

## 2018-01-30 ENCOUNTER — OFFICE VISIT (OUTPATIENT)
Dept: SPEECH THERAPY | Facility: CLINIC | Age: 68
End: 2018-01-30
Payer: COMMERCIAL

## 2018-01-30 ENCOUNTER — OFFICE VISIT (OUTPATIENT)
Dept: PHYSICAL THERAPY | Facility: CLINIC | Age: 68
End: 2018-01-30
Payer: COMMERCIAL

## 2018-01-30 DIAGNOSIS — I63.9 CEREBROVASCULAR ACCIDENT (CVA), UNSPECIFIED MECHANISM (HCC): Primary | ICD-10-CM

## 2018-01-30 DIAGNOSIS — I63.9 CEREBROVASCULAR ACCIDENT (CVA), UNSPECIFIED MECHANISM (HCC): ICD-10-CM

## 2018-01-30 DIAGNOSIS — I69.391 DYSPHAGIA STATUS POST CEREBROVASCULAR ACCIDENT: Primary | ICD-10-CM

## 2018-01-30 DIAGNOSIS — G81.91 RIGHT HEMIPARESIS (HCC): Primary | ICD-10-CM

## 2018-01-30 LAB
ACHR BIND AB SER-SCNC: <0.03 NMOL/L (ref 0–0.24)
ACHR BLOCK AB/ACHR TOTAL SFR SER: 8 % (ref 0–25)

## 2018-01-30 PROCEDURE — 97112 NEUROMUSCULAR REEDUCATION: CPT

## 2018-01-30 PROCEDURE — 97535 SELF CARE MNGMENT TRAINING: CPT

## 2018-01-30 PROCEDURE — 97110 THERAPEUTIC EXERCISES: CPT

## 2018-01-30 PROCEDURE — 92507 TX SP LANG VOICE COMM INDIV: CPT

## 2018-01-30 PROCEDURE — 92526 ORAL FUNCTION THERAPY: CPT

## 2018-01-30 NOTE — PROGRESS NOTES
Daily Note     Today's date: 2018  Patient name: Krishna Cole  : 1950  MRN: [id number]  Referring provider: LUCIA Panda  Dx:   Encounter Diagnosis   Name Primary?  Cerebrovascular accident (CVA), unspecified mechanism ([facility] ) Yes              Subjective: Grandson stated that pt is "lazy" and would not follow through with a home putty program        Objective: See treatment diary below      Assessment: Pt tolerated BIONESS on neurore-ed mode R hand for 10 min  IASTM questionnaire completed via grandson due to language barrier - DO NOT PERFORM  Educated pt on le theraputty HEP and handouts given in [place]  Pt completed hand-to-target task using pegs and foam board placed on slanted surface to encourage proximal stability and strengthening while simultaneously facilitating automaticity of grasp/release of R hand  Pt completed with max difficulty and would try to use his L hand to compensate, requiring frequent cues to only use R hand  Plan: Continued skilled OT per POC with focus on RUE neuro-retrainging  INTERVENTION COMMENTS:  Diagnosis: Cerebrovascular accident (CVA), unspecified mechanism ([facility] ) [I63 9]  Precautions: CVA  [facility]  4 of 10 visits, PN scheduled 18 (pt will require auth)

## 2018-01-30 NOTE — PROGRESS NOTES
Daily Speech Treatment Note    Primary Diagnosis/Billing code: I69 230, I69 391  Secondary Diagnosis/ Billing code: I63 9    Visit Tracking:  -Visit #7  -CBC  ($30 co-pay - AUTH REQ)  -RE due 2/4/18  Subjective/Behavioral: 1:1 ST x 60 minutes  Pt arrived to facility with grandson, Pt was attentive and participated well during session  Objective/Assessment:  -Patient's family member/caregiver was present during today's session   -Reviewed HEP with pt, Pt was  asked to choose a similar  word to a given word   (e g  fix: repair, break, sun) Task completed with 100% accuracy  Goal 1 (Patient will produce sound approximations in 80% of opp given max cueing ):  (to be achieved in 4-6 weeks)-Targeted  Pt was asked to open mouth and say /ah/ he was able to open mouth 10x and grunted while attempting to say /ahh/       Goal 2 (Patient will correctly identify a related phrase or sentence when given a picture in 80% of opps  ):  (to be achieved in 4-6 weeks)-Targeted    Pt was  asked to match picture to correct sentence when presented with 2 sentences, he was able to complete task with 89% accuracy  Pt required minimal verbal cues during task,  pt was able to read words and sentences independently during tasks  Goal 3 (Patient will write words correctly in 80% of opps when verbally presented ):  (to be achieved in 4-6 weeks)-Targeted    Pt was presented with 30 words and 6 different categories  He was asked to write each word under the correct category  He completed task with 79% accuracy given min verbal cues  He required mod verbal cues during task to spell words correctly  Goal 4: (Patient will write the name of a visually presented object in 80% of opps ) (to be achieved in 4-6eks) Targeted    Clinician utilized Language natalya  Pt was presented with a picture of an object and was asked to write the name of the object  Task completed with 68% accuracy, pt required mod-max verbal cues to increase success  Goal 5: (Patient will participate in further assessment of AAC use) (to be achieved in 4-6 weeks)    Goal 6: (Patient will match picture to word out of a field of 10 ) (to be achieved in 4-6 weeks)  Pt was     Goal 7: (Patient will improve initiation of swallow trigger with use of gustatory/TTS to less then 5 secs )  (to be achieved in 4-6 weeks)    Goal 8: (Patient will improve oral responsiveness/volitional movement of oral structures with use of gusttory/TTS in 50% of opps ) (to be achieved in 4-6 weeks)-Targeted    Initiated oral massage for stimulation  Massaged areas of the masseter and buccinatory muscles for 15minutes  Pt was able to tolerate massage  Pt was then asked to open and close mouth on a tongue depressor  He did so 10x  Pt had a difficult time pressing lips tightly on tongue depressor, he required mod-max verbal cues to increase success  Plan:  -Patient was provided with home exercises/activities to target goals in plan of care at the end of today's session     **Pt schedule was updated, he was given more visits for ST/OT/PT**  Continue POC

## 2018-01-30 NOTE — PROGRESS NOTES
Daily Note     Today's date: 2018  Patient name: Irena Sahu  : 1950  MRN: [id number]  Referring provider: LUCIA Linder  Dx:   Encounter Diagnosis   Name Primary?  Right hemiparesis (HCC) Yes                  Subjective: No complaints prior to session  Denies pain  Objective: See treatment diary below  Daily Treatment Diary     Exercise Diary  18      nustep 10 min       Step ups-6'' 10x       Alt step taps-6'' 10x 10x      Side stepping 1 1/2 laps       Stepping over hurdles 3 laps 2 laps fwd and 1 lap lat      Gait training-NBQC with tape marks for step length [id number] 100ft      STS  5x L foot staggered fwd, 5x L foot on 4"      LAQ   0# R, 4# L 15xea      HR/TR  20xea                                                                                                    Assessment: Tolerated treatment well  Patient performed hurdles in standing this session with step to gait pattern and dependence on the [employer] [facility] for balance  Patient demonstrates difficulty maintaining sideways positioning, requiring frequent tactile cues to maintain  Also attempted ambulation in the solo step with no AD, patient demonstrates festinating gait pattern with frequent LOB  Patient could benefit from continued PT    Plan: Continue per plan of care

## 2018-01-31 ENCOUNTER — HOSPITAL ENCOUNTER (OUTPATIENT)
Dept: NON INVASIVE DIAGNOSTICS | Facility: CLINIC | Age: 68
Discharge: HOME | End: 2018-01-31
Payer: COMMERCIAL

## 2018-01-31 ENCOUNTER — HOSPITAL ENCOUNTER (OUTPATIENT)
Dept: RADIOLOGY | Facility: HOSPITAL | Age: 68
Discharge: HOME | End: 2018-01-31
Attending: PSYCHIATRY & NEUROLOGY
Payer: COMMERCIAL

## 2018-01-31 DIAGNOSIS — G51.0 BELL'S PALSY: ICD-10-CM

## 2018-01-31 DIAGNOSIS — R47.01 APHASIA: ICD-10-CM

## 2018-01-31 LAB — ACHR MOD AB/ACHR TOTAL SFR SER: <12 % (ref 0–20)

## 2018-01-31 PROCEDURE — 76376 3D RENDER W/INTRP POSTPROCES: CPT

## 2018-01-31 PROCEDURE — 70551 MRI BRAIN STEM W/O DYE: CPT

## 2018-01-31 PROCEDURE — 93880 EXTRACRANIAL BILAT STUDY: CPT

## 2018-01-31 PROCEDURE — 93880 EXTRACRANIAL BILAT STUDY: CPT | Performed by: SURGERY

## 2018-02-01 ENCOUNTER — OFFICE VISIT (OUTPATIENT)
Dept: PHYSICAL THERAPY | Facility: CLINIC | Age: 68
End: 2018-02-01
Payer: COMMERCIAL

## 2018-02-01 ENCOUNTER — OFFICE VISIT (OUTPATIENT)
Dept: OCCUPATIONAL THERAPY | Facility: CLINIC | Age: 68
End: 2018-02-01
Payer: COMMERCIAL

## 2018-02-01 ENCOUNTER — OFFICE VISIT (OUTPATIENT)
Dept: SPEECH THERAPY | Facility: CLINIC | Age: 68
End: 2018-02-01
Payer: COMMERCIAL

## 2018-02-01 DIAGNOSIS — G81.91 RIGHT HEMIPARESIS (HCC): Primary | ICD-10-CM

## 2018-02-01 DIAGNOSIS — I63.9 CEREBROVASCULAR ACCIDENT (CVA), UNSPECIFIED MECHANISM (HCC): Primary | ICD-10-CM

## 2018-02-01 DIAGNOSIS — I69.391 DYSPHAGIA STATUS POST CEREBROVASCULAR ACCIDENT: ICD-10-CM

## 2018-02-01 PROCEDURE — 92507 TX SP LANG VOICE COMM INDIV: CPT

## 2018-02-01 PROCEDURE — 97535 SELF CARE MNGMENT TRAINING: CPT

## 2018-02-01 PROCEDURE — 97112 NEUROMUSCULAR REEDUCATION: CPT

## 2018-02-01 PROCEDURE — 92526 ORAL FUNCTION THERAPY: CPT

## 2018-02-01 PROCEDURE — 97140 MANUAL THERAPY 1/> REGIONS: CPT

## 2018-02-01 PROCEDURE — 97116 GAIT TRAINING THERAPY: CPT

## 2018-02-01 NOTE — PROGRESS NOTES
Daily Note     Today's date: 2018  Patient name: Aureliano Yoon  : 1950  MRN: [id number]  Referring provider: LUCIA Hines  Dx:   Cerebrovascular accident (CVA), unspecified mechanism ([facility] [address] ) [I63 9]           Subjective: Per grandson, pt has not been following through with putty HEP  Objective: See treatment diary below    Assessment: Pt tolerated BIONESS on neurore-ed mode R hand for 10 min while performing MMP using yellow physioball  AAROM using R shoulder - pt tolerated well but required frequent cues to keep elbow extended during exercise  K-tape applied to R shoulder/scapula and back to facilitate shoulder retraction and scapula approximation and improve posture  Education on skin integrity provided to daughter  Verbal/phsyical cues to don/doff overhead shirt  Using marker built-up with foam, pt completed 2 tracing worksheets  Required verbal cues to only use R hand  Using palmar grasp pt was able to hold marker, but displayed max difficulty controlling marker to trace lines/shapes  Due to fatigue, pt used L hand to stabilize R towards end of session  WHEP for hand strengthening given to pt in [place]  Pt able to oppose D1 to D2 and D3; [place] to oppose D1 to D4 and D5  Plan: Continued skilled OT per POC with focus on RUE neuro-retraining and functional use of R hand  INTERVENTION COMMENTS:  Diagnosis: C[facility]brovascular accident (CVA), unspecified [facility] ) [I63 9]  Precautions: CVA  Ins[facility]  5 of 10 visits, PN scheduled 18 (pt will require auth)

## 2018-02-01 NOTE — PROGRESS NOTES
Daily Speech Treatment Note    Primary Diagnosis/Billing code: I69 230, I69 391  Secondary Diagnosis/ Billing code: I63 9    Visit Tracking:  -Visit #9  -CBC  ($30 co-pay - AUTH REQ)  -RE due 2/4/18  Subjective/Behavioral: 1:1 ST x 60 minutes  Pt arrived to facility with daughter, Pt was attentive and participated well during session  Objective/Assessment:  -Pt did not complete HEP  Goal 1 (Patient will produce sound approximations in 80% of opp given max cueing ):  (to be achieved in 4-6 weeks)      Goal 2 (Patient will correctly identify a related phrase or sentence when given a picture in 80% of opps  ):  (to be achieved in 4-6 weeks)    Goal 3 (Patient will write words correctly in 80% of opps when verbally presented ):  (to be achieved in 4-6 weeks)-Targeted  Pt was given 10 words verbally, he was able to write words correctly in 8/10 opportunities  Goal 4: (Patient will write the name of a visually presented object in 80% of opps ) (to be achieved in 4-6eks) Targeted    Clinician utilized bananagrams andd [employer] "object photographs"  Pt was presented with a picture of an object and was asked to use [place] to spell out the word of the object  He completed task with 88% accuracy, he required minimal verbal cues during task  Goal 5: (Patient will participate in further assessment of AAC use) (to be achieved in 4-6 weeks)    Goal 6: (Patient will match picture to word out of a field of 10 ) (to be achieved in 4-6 weeks)  Pt was     Goal 7: (Patient will improve initiation of swallow trigger with use of gustatory/TTS to less then 5 secs )  (to be achieved in 4-6 weeks)    Goal 8: (Patient will improve oral responsiveness/volitional movement of oral structures with use of gusttory/TTS in 50% of opps ) (to be achieved in 4-6 weeks)-Targeted    Initiated oral massage for stimulation  Massaged areas of the masseter and buccinatory muscles for 10minutes  Pt was able to tolerate massage   Pt was then asked to open and close mouth on a tongue depressor  He did so 3x  He required clinician model in every opp  Performed TT/G stimulation exercises using lemon swabs  ( placed swabs on anterior faucial pillars and lingual surface  with cues for fast swallow ) Patient was noted to have increase in swallow trigger time in approx 2/4  trials however still delayed in all instances and x2  instance of no swallow response  No overt distress or s/s of aspiration today  Plan:  -Patient was provided with home exercises/activities to target goals in plan of care at the end of today's session     Continue POC

## 2018-02-01 NOTE — PROGRESS NOTES
Daily Note     Today's date: 2018  Patient name: Irena Sauh  : 1950  MRN: [id number]  Referring provider: LUCIA Linder  Dx:   Encounter Diagnosis   Name Primary?  Right hemiparesis (HCC) Yes                  Subjective: No new complaints  Daily Note     Today's date: 2018  Patient name: Irena Sahu  : 1950  MRN: [id number]  Referring provider: LUCIA Linder  Dx:   Encounter Diagnosis   Name Primary?  Right hemiparesis (HCC) Yes                  Subjective: No complaints prior to session  Objective: See treatment diary below  Daily Treatment Diary     Exercise Diary  18     nustep 10 min  5 min     Step ups-6'' 10x  10x     Alt step taps-6'' 10x 10x      Side stepping 1 1/2 laps       Stepping over hurdles 3 laps 2 laps fwd and 1 lap lat 2 lap fwd, 1 lap lateral     Gait training-NBQC with tape marks for step length [id number] 100ft 1 lap      STS  5x L foot staggered fwd, 5x L foot on 4" 10x     LAQ   0# R, 4# L 15xea      HR/TR  20xea      TM   unable     Gait training- no AD   1 lap     Alt heel taps-6''   10x                                                                           Assessment: Trialed TM walking today for gait training without success  Patient unable to keep up with track even at slow pace despite cueing  Patient continues to require cueing for increased step length and step through gait pattern  Impression that patient has difficulty comprehending at times  Plan: Continue per plan of care

## 2018-02-02 LAB — MISCELLANEOUS LAB TEST RESULT: NORMAL

## 2018-02-06 ENCOUNTER — EVALUATION (OUTPATIENT)
Dept: OCCUPATIONAL THERAPY | Facility: CLINIC | Age: 68
End: 2018-02-06
Payer: COMMERCIAL

## 2018-02-06 ENCOUNTER — OFFICE VISIT (OUTPATIENT)
Dept: SPEECH THERAPY | Facility: CLINIC | Age: 68
End: 2018-02-06
Payer: COMMERCIAL

## 2018-02-06 ENCOUNTER — OFFICE VISIT (OUTPATIENT)
Dept: PHYSICAL THERAPY | Facility: CLINIC | Age: 68
End: 2018-02-06
Payer: COMMERCIAL

## 2018-02-06 DIAGNOSIS — I63.9 CEREBROVASCULAR ACCIDENT (CVA), UNSPECIFIED MECHANISM (HCC): ICD-10-CM

## 2018-02-06 DIAGNOSIS — I69.391 DYSPHAGIA STATUS POST CEREBROVASCULAR ACCIDENT: ICD-10-CM

## 2018-02-06 DIAGNOSIS — I69.320 APHASIA AS LATE EFFECT OF CEREBROVASCULAR ACCIDENT (CVA): Primary | ICD-10-CM

## 2018-02-06 DIAGNOSIS — G81.91 RIGHT HEMIPARESIS (HCC): Primary | ICD-10-CM

## 2018-02-06 PROCEDURE — 92507 TX SP LANG VOICE COMM INDIV: CPT

## 2018-02-06 PROCEDURE — 97110 THERAPEUTIC EXERCISES: CPT | Performed by: PHYSICAL THERAPIST

## 2018-02-06 PROCEDURE — 97530 THERAPEUTIC ACTIVITIES: CPT

## 2018-02-06 PROCEDURE — 97112 NEUROMUSCULAR REEDUCATION: CPT | Performed by: PHYSICAL THERAPIST

## 2018-02-06 PROCEDURE — 92526 ORAL FUNCTION THERAPY: CPT

## 2018-02-06 PROCEDURE — 97535 SELF CARE MNGMENT TRAINING: CPT

## 2018-02-06 NOTE — PROGRESS NOTES
Daily Speech Treatment Note    Primary Diagnosis/Billing code: I69 320, I69 391  Secondary Diagnosis/ Billing code: I63 9    Visit Tracking:  -Visit #10  -CBC  ($30 co-pay - Aramis Hatfield)  -RE due 2/8/18  Subjective/Behavioral: 1:1 ST x 60 minutes  Co tx session with SLP Velia Puckett   Pt arrived to facility with daughter he participated well during session  Objective/Assessment:  -Pt did not complete HEP  Goal 1 (Patient will produce sound approximations in 80% of opp given max cueing ):  (to be achieved in 4-6 weeks)  DNT    Goal 2 (Patient will correctly identify a related phrase or sentence when given a picture in 80% of opps  ):  (to be achieved in 4-6 weeks) GOAL MET    Goal 3 (Patient will write words correctly in 80% of opps when verbally presented ):  (to be achieved in 4-6 weeks)-GOAL MET    Goal 4: (Patient will write the name of a visually presented object in 80% of opps ) (to be achieved in 4-6eks) GOAL MET    Goal 5: (Patient will participate in further assessment of AAC use) (to be achieved in 4-6 weeks) Targeted    Pt was introduced to [address]  Clinician Introduced icons with picture and voice, adjusted icon name and size to pt's preference  Given clinician assistance, pt navigated device upon command (show me where family members are, find yes/no) with less than 30% accuracy  When presented with various icons of "needs" he independently selected " I need a break"1x  Goal 6: (Patient will match picture to word out of a field of 10 ) (to be achieved in 4-6 weeks)  Pt was     Goal 7: (Patient will improve initiation of swallow trigger with use of gustatory/TTS to less then 5 secs )  (to be achieved in 4-6 weeks)    Goal 8: (Patient will improve oral responsiveness/volitional movement of oral structures with use of gusttory/TTS in 50% of opps ) (to be achieved in 4-6 weeks)    Patient tolerated NMES (min threshold: 4 mA & max threshold: 5 5 mA) in conjunction with exercises   Mo-mas cues to initiate exercises  Lemon swabs used for tactile and sensory stimulation  Patient noted to have increased saliva production today with limited ability to self regulate  Attempts to swallow on command are still poor  Patient able to retract lips with 75% acc, unable to protrude with max cues  Channel(s) used: 1,2   Placement: 4a   Duty Cycle: n/a   Frequency: 80 pulses per second   Phase Duration: 300 microseconds   Total Time: 10 minutes           Plan:  -Patient was provided with home exercises/activities to target goals in plan of care at the end of today's session       Continue POC

## 2018-02-06 NOTE — PROGRESS NOTES
Daily Note     Today's date: 2018  Patient name: Carlos Prince  : 1950  MRN: [id number]  Referring provider: LUCIA Us  Dx:   Encounter Diagnosis   Name Primary?  Right hemiparesis (HCC) Yes                  Subjective: No changes to report, feeling fine  Objective: See treatment diary below  Daily Treatment Diary     Exercise Diary  18    nustep 10 min  5 min 10min L5    Step ups-6'' 10x  10x 15 x ea    Alt step taps-6'' 10x 10x      Side stepping 1 1/2 laps   3 laps    Stepping over hurdles 3 laps 2 laps fwd and 1 lap lat 2 lap fwd, 1 lap lateral     Gait training-NBQC with tape marks for step length [id number] 100ft 1 lap  1 lap    STS  5x L foot staggered fwd, 5x L foot on 4" 10x 10x 2     LAQ   0# R, 4# L 15xea  30xea    HR/TR  20xea      TM   unable     Gait training- no AD   1 lap     Alt heel taps-6''   10x     marches    30x ea                                                                  Assessment: Patient had difficulty with step length continued this session  With physical and tactile cues pt was able to increase stride length in //bars  Plan to continue at next session  Plan to trial TM once again  Plan: Continue per plan of care
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
## 2018-02-08 ENCOUNTER — OFFICE VISIT (OUTPATIENT)
Dept: OCCUPATIONAL THERAPY | Facility: CLINIC | Age: 68
End: 2018-02-08
Payer: COMMERCIAL

## 2018-02-08 ENCOUNTER — OFFICE VISIT (OUTPATIENT)
Dept: PHYSICAL THERAPY | Facility: CLINIC | Age: 68
End: 2018-02-08
Payer: COMMERCIAL

## 2018-02-08 ENCOUNTER — EVALUATION (OUTPATIENT)
Dept: SPEECH THERAPY | Facility: CLINIC | Age: 68
End: 2018-02-08
Payer: COMMERCIAL

## 2018-02-08 DIAGNOSIS — I63.9 CEREBROVASCULAR ACCIDENT (CVA), UNSPECIFIED MECHANISM (HCC): ICD-10-CM

## 2018-02-08 DIAGNOSIS — G81.91 RIGHT HEMIPARESIS (HCC): Primary | ICD-10-CM

## 2018-02-08 DIAGNOSIS — I69.320 APHASIA AS LATE EFFECT OF CEREBROVASCULAR ACCIDENT (CVA): Primary | ICD-10-CM

## 2018-02-08 DIAGNOSIS — I63.9 CEREBROVASCULAR ACCIDENT (CVA), UNSPECIFIED MECHANISM (HCC): Primary | ICD-10-CM

## 2018-02-08 PROCEDURE — G9159 LANG COMP CURRENT STATUS: HCPCS

## 2018-02-08 PROCEDURE — 97116 GAIT TRAINING THERAPY: CPT | Performed by: PHYSICAL THERAPIST

## 2018-02-08 PROCEDURE — 96105 ASSESSMENT OF APHASIA: CPT

## 2018-02-08 PROCEDURE — 97112 NEUROMUSCULAR REEDUCATION: CPT | Performed by: PHYSICAL THERAPIST

## 2018-02-08 PROCEDURE — 92507 TX SP LANG VOICE COMM INDIV: CPT

## 2018-02-08 PROCEDURE — G9160 LANG COMP GOAL STATUS: HCPCS

## 2018-02-08 PROCEDURE — 97112 NEUROMUSCULAR REEDUCATION: CPT

## 2018-02-08 PROCEDURE — 97535 SELF CARE MNGMENT TRAINING: CPT

## 2018-02-08 NOTE — PROGRESS NOTES
Speech-Language Pathology Re-Evaluation    Today's date: 2018  Patients name: Dania Brunner  : 1950  MRN: [id number]  Safety measures: Fall Risk  Referring provider: LUCIA Nixon      Subjective comments: Pt arrived to facility with daughter  He participate well during session  Primary language: Polish   Preferred language: Polish       Current/prior services being received: Physical Therapy, Occupational Therapy  and Speech Therapy    Mental status: Alert  Behavior status: Cooperative  Communication modalities: Non-verbal  Recent speech/language therapy: Outpatient rehab  Rehabilitation prognosis: Good rehab potential to reach the established goals    Assessments    Language Assessment   Standardized testing       The [place] Assessment of Severe Aphasia (BASA) is an examination that identifies and quantifies the abilities of a patient with severe aphasia  The BASA is appropriate for aphasic individuals who have severe impairments in both comprehension and production of language  The following scores were obtained today:    Severe Aphasia Norms: Raw Score: Standard Score: %ile Rank:   Auditory Comprehension 7 8 25%ile   Praxis  3 10 50%ile   Oral-Gestural Expression  0 4 2%ile   Reading Comprehension  10 13 84%ile   Other Items  7 15 95%ile         BASA total  27 9 37%ile         Global Aphasia Norms: Raw Score: Standard Score: %ile Rank:   Auditory Comprehension 7 10 50%ile   Praxis  3 11 63%ile   Oral-Gestural Expression  0 5 5%ile   Reading Comprehension  10 14 91%ile   Other Items  7 16 98%ile         BASA total  27 10 50%ile            Goals  Short-term goals:      Goal 1   Patient will improve initiation of swallow trigger with use of gustatory/TTS to less then 5 secs ) (to be achieved in 4-6 weeks)     Goal 2: (Patient will improve oral responsiveness/volitional movement of oral structures with use of gusttory/TTS in 50% of opps ) (to be achieved in 4-6 weeks)    Goal 3: Patient will demonstrate functional use of AAC device across 2-4 therapy sessions with min cues provided  Goal 4: Patient's caregiver will demonstrate ability to program AAC device - adding new icons, etc with 100% acc  Goal 5: (Patient will correctly identify a related phrase or sentence when given a picture in 80% of opps  ): (to be achieved in 4-6 weeks) GOAL MET     Goal 6: (Patient will write words correctly in 80% of opps when verbally presented ): (to be achieved in 4-6 weeks)-GOAL MET     Goal 7: (Patient will write the name of a visually presented object in 80% of opps ) (to be achieved in 4-6eks) GOAL MET  Goal 8: (Patient will match picture to word out of a field of 10 ) (to be achieved in 4-6 weeks)     Long-term goals:   1  Patient will trial Lingraphica AAC device for 30 days to improve expressive communication by discharge  2  Patient will improve swallow function to moderate to improve potential for po and reduce the risk of aspiration, malnutrition and dehydration  3  Patient will improve ability to communicate wants/needs  Functional Limitations Reporting (G-codes):   Flowsheet Rows    Flowsheet Row Most Recent Value   SLP G-Codes   FOTO information reviewed  N/A   Assessment Type  Re-evaluation   Functional Limitations  Spoken language expressive   Spoken Language Comprehension Current Status ()  CM   Spoken Language Comprehension Goal Status ()  CK            Impressions/Recommendations    Impressions: Patient continues to present with severe oropharyngeal dysphagia c/b  no spoon stripping/oral closure, limited to no and delayed  bolus formation and control with anterior spillage, little to no and delayed A/P transit with suspected premature spillage, significantly delayed swallow initiation and decreased Hyolaryngeal elevation and excursion with delayed reflexive cough noted  Suspect silent aspiration  Recommend strict NPO with aspiration precautions      Patient also continues to present with mod-severe expressive aphasia c/b no verbalizations, decreased overall communication ability  Pt has made steady progress toward achieving his goals  New goals have been added to his POC  Recommendations:  -Patient would benefit from outpatient skilled Speech Therapy services : Speech/ language therapy and Dysphagia therapy    -Frequency: 2x weekly  -Duration: 4-6 weeks    -Intervention certification from: 0/0/6666  -Intervention certification to: 6/35/5516    -Intervention comments:   Evaluation 10:00-10:35  Treatment: 10:35-11:05  Evaluation scoring and write up: 11:05-12:05      Visit Tracking:  -Visit #11  -CBC  ($30 co-pay - Mo Mcdermott)  -RE due 3/22/2018

## 2018-02-08 NOTE — PROGRESS NOTES
Daily Note     Today's date: 2018  Patient name: Aureliano Yoon  : 1950  MRN: [id number]  Referring provider: LUCIA Hines  Dx:   Encounter Diagnosis   Name Primary?  Right hemiparesis ([address] ) Yes                  Subjective: Pt has no new complaints    Objective: See treatment diary below    Daily Treatment Diary      Exercise Diary  18   nustep 10 min   5 min 10min L5     Step ups-6'' 10x   10x 15 x ea  15x ea   Alt step taps-6'' 10x 10x         Side stepping 1 1/2 laps     3 laps  2 laps   Stepping over hurdles 3 laps 2 laps fwd and 1 lap lat 2 lap fwd, 1 lap lateral       Gait training-NBQC with tape marks for step length [id number] 100ft 1 lap  1 lap  no tape, verbal cues for increased step length   STS   5x L foot staggered fwd, 5x L foot on 4" 10x 10x 2   2 x 10    LAQ    0# R, 4# L 15xea   30xea     HR/TR   20xea         TM     unable       Gait training- no AD     1 lap       Alt heel taps-6''     10x       marches       30x ea     Gait training - // bars with L UE only          2 laps                                                                                                 Assessment: Session limited due to pt arriving 30 min late  During sidestepping, able to take larger steps bilaterally and demo increased weightbearing on R Le  Pt continued to demo significant L lean during ambulation but able to demo increased L step length with verbal cues  Continue with gait training Nv  Plan: Progress treatment as tolerated

## 2018-02-08 NOTE — PROGRESS NOTES
Daily Note     Today's date: 2018  Patient name: Nitin Camejo  : 1950  MRN: [id number]  Referring provider: LUCIA Marshall  Dx:   Encounter Diagnosis   Name Primary?  Cerebrovascular accident (CVA), unspecified mechanism ([facility] [address] ) Yes     Session supervised by ÁLVARO Livingston/L  Subjective: Pt remained nonverbal throughout session but gestured by waving, thumbs up, and smiling  Objective: See treatment diary below    Assessment: Tolerated treatment well  Pt tolerated BIONESS on RUE flex/ext neuro re-ed mode for 10 min  AAROM with ranger for FF, HR ABD/ADD, and scaption of RUE  Pt required frequent cues to perform shoulder movements and not use compensatory technique with trunk  Hand-to-target task with bean bags for grasp/release, pt required cues to use R hand, assisted with LUE skilled nursing through due to R hand fatigue  Pt placed poms into tennis ball with focus on B/LUE integration, R hand strengthening and sustained pinch  Pt utilized lateral pinch of R hand to transport poms  K-tape from previous session removed with alcohol, no skin irritation noted  K-tape to be applied again next session  Plan: Continue skilled OT per POC      INTERVENTION COMMENTS:  Diagnosis: L MCA CVA, vascular dementia  Precautions: PEG, fall risk, AD, aphasia, vascular dementia  Insurance: [facility] Rep  Visit 7 of 8 PN due 3/6/2018

## 2018-02-09 ENCOUNTER — OFFICE VISIT (OUTPATIENT)
Dept: NEUROLOGY | Facility: CLINIC | Age: 68
End: 2018-02-09
Payer: COMMERCIAL

## 2018-02-09 DIAGNOSIS — I69.320 APHASIA AS LATE EFFECT OF CEREBROVASCULAR ACCIDENT: ICD-10-CM

## 2018-02-09 DIAGNOSIS — I69.30 CHRONIC ISCHEMIC LEFT MCA STROKE: Primary | ICD-10-CM

## 2018-02-09 DIAGNOSIS — R13.10 DYSPHAGIA, UNSPECIFIED TYPE: ICD-10-CM

## 2018-02-09 PROCEDURE — 99214 OFFICE O/P EST MOD 30 MIN: CPT | Performed by: PSYCHIATRY & NEUROLOGY

## 2018-02-09 RX ORDER — DONEPEZIL HYDROCHLORIDE 10 MG/1
10 TABLET, FILM COATED ORAL
Qty: 30 TABLET | Refills: 3 | Status: SHIPPED | OUTPATIENT
Start: 2018-02-09 | End: 2018-03-02 | Stop reason: SDUPTHER

## 2018-02-09 RX ORDER — CARVEDILOL 12.5 MG/1
12.5 TABLET ORAL DAILY
COMMUNITY

## 2018-02-09 RX ORDER — SIMVASTATIN 40 MG
40 TABLET ORAL
COMMUNITY
End: 2018-03-02 | Stop reason: SDUPTHER

## 2018-02-09 RX ORDER — DILTIAZEM HYDROCHLORIDE 300 MG/1
300 CAPSULE, EXTENDED RELEASE ORAL DAILY
COMMUNITY
End: 2018-03-02 | Stop reason: SDUPTHER

## 2018-02-09 RX ORDER — MEMANTINE HYDROCHLORIDE 10 MG/1
10 TABLET ORAL DAILY
COMMUNITY
End: 2018-03-02 | Stop reason: SDUPTHER

## 2018-02-09 NOTE — PROGRESS NOTES
Patient ID: Nitin Camejo is a 69 y o  male  Assessment/Plan: This is a 69 year who is here as a follow up of left MCA stroke April 2017, facial palsy who is here for a follow up for dysphagia, and aphasia  Neurologic examination consistent with right sided symptoms, RUE weakness, and RLE weakness, he has aphasia, but does seem to understand me  He uses a wheelchair to get around  For aphasia which is likely 2/2 left MCA stroke which was in [place]  MRI neuroquant was negative  U/S carotids negative for any stenosis  Etiology at this time is unclear likely large vessel since he does have vascular risk factors  Patient still has dysphagia as well  PLAN:  -Continue with Aspirin and Simvastatin for stroke prevention  -will start Aricept 10mg for aphasia to help stimulate verbal output  -continue speech therapy/occupational therapy  -obtain barium swallow   Follow up with me in 3 months  Problem List Items Addressed This Visit     Chronic ischemic left MCA stroke - Primary    Relevant Orders    Ambulatory referral to Speech Therapy    Dysphagia    Relevant Orders    FL barium swallow video w speech    Aphasia as late effect of cerebrovascular accident    Relevant Medications    donepezil (ARICEPT) 10 mg tablet             Subjective:    HPI    This is a 69year old M who is here for a left MCA stroke follow up  His stroke occurred in [place] and workup consisted of echo which showed EF of 65%  He still has dysphagia, and aphasia and right sided weakness, which is still present  He has a peg tube in place  He continues to drool today in the office  He is only Lao speaking and I used the  [employer] [id number] for translation  He uses a wheelchair to get around  He continues to have Pt/speech therapy  He is compliant with Aspirin and Simvastatin for stroke prevention      The following portions of the patient's history were reviewed and updated as appropriate:   He  has no past medical history on file  He  does not have any pertinent problems on file  He  has no past surgical history on file  His family history is not on file  He  has no tobacco, alcohol, and drug history on file  Current Outpatient Prescriptions   Medication Sig Dispense Refill    carvedilol (COREG) 12 5 mg tablet Take 12 5 mg by mouth daily      diltiazem (TIAZAC) 300 MG 24 hr capsule Take 300 mg by mouth daily      memantine (NAMENDA) 10 mg tablet Take 10 mg by mouth daily      Nutritional Supplements (GLUCERNA 1 2 LUNA PO) Take 1 2 Cans by mouth daily      simvastatin (ZOCOR) 40 mg tablet Take 40 mg by mouth daily at bedtime      donepezil (ARICEPT) 10 mg tablet Take 1 tablet (10 mg total) by mouth daily at bedtime 30 tablet 3     No current facility-administered medications for this visit  No current outpatient prescriptions on file prior to visit  No current facility-administered medications on file prior to visit  He has No Known Allergies            Objective: There were no vitals taken for this visit  Physical Exam  General - alert, awake, follows commands  HEENT: normocephalic, atraumatic  Skin - no lesions    Neurological Exam  Mental status - alert, awake, follows commands  Speech - mute, able to comprehend, and follows some commands, naming not intact  Cranial nerves - mild right facial droop noted  Motor - 5/5 on the left, RUE 4/5 and RLE 4/5  Gait - deferred patient uses a wheelchair  ROS:    Review of Systems   Constitutional: Negative  HENT: Negative  Eyes: Negative  Respiratory: Negative  Cardiovascular: Negative  Gastrointestinal: Negative  Endocrine: Negative  Genitourinary: Negative  Musculoskeletal: Negative  Skin: Negative  Allergic/Immunologic: Negative  Neurological: Negative  Hematological: Negative  Psychiatric/Behavioral: Negative

## 2018-02-13 ENCOUNTER — OFFICE VISIT (OUTPATIENT)
Dept: OCCUPATIONAL THERAPY | Facility: CLINIC | Age: 68
End: 2018-02-13
Payer: COMMERCIAL

## 2018-02-13 ENCOUNTER — OFFICE VISIT (OUTPATIENT)
Dept: SPEECH THERAPY | Facility: CLINIC | Age: 68
End: 2018-02-13
Payer: COMMERCIAL

## 2018-02-13 ENCOUNTER — EVALUATION (OUTPATIENT)
Dept: PHYSICAL THERAPY | Facility: CLINIC | Age: 68
End: 2018-02-13
Payer: COMMERCIAL

## 2018-02-13 DIAGNOSIS — I69.30 CHRONIC ISCHEMIC LEFT MCA STROKE: Primary | ICD-10-CM

## 2018-02-13 DIAGNOSIS — I69.320 APHASIA AS LATE EFFECT OF CEREBROVASCULAR ACCIDENT: ICD-10-CM

## 2018-02-13 DIAGNOSIS — I63.9 CEREBROVASCULAR ACCIDENT (CVA), UNSPECIFIED MECHANISM (HCC): Primary | ICD-10-CM

## 2018-02-13 DIAGNOSIS — I69.320 APHASIA AS LATE EFFECT OF CEREBROVASCULAR ACCIDENT (CVA): Primary | ICD-10-CM

## 2018-02-13 PROCEDURE — 97110 THERAPEUTIC EXERCISES: CPT

## 2018-02-13 PROCEDURE — G8979 MOBILITY GOAL STATUS: HCPCS

## 2018-02-13 PROCEDURE — 92507 TX SP LANG VOICE COMM INDIV: CPT

## 2018-02-13 PROCEDURE — 97535 SELF CARE MNGMENT TRAINING: CPT

## 2018-02-13 PROCEDURE — 97112 NEUROMUSCULAR REEDUCATION: CPT

## 2018-02-13 PROCEDURE — G8978 MOBILITY CURRENT STATUS: HCPCS

## 2018-02-13 PROCEDURE — 97164 PT RE-EVAL EST PLAN CARE: CPT

## 2018-02-13 NOTE — PROGRESS NOTES
Daily Speech Treatment Note    Primary Diagnosis/Billing code: I69 5, I69 391  Secondary Diagnosis/ Billing code: I63 9    Visit Tracking:  Visit Tracking:  -Visit #12  -CBC  ($30 co-pay - Derl Lieu)  -RE due 3/22/2018  Short-term goals:     Subjective/Behavioral: 1:1 ST x 60 minutes  Pt arrived to facility with daughter he participated well during session  Objective/Assessment:  HEP: Dtr reported she introduced device to familiy members and educated them on how to utilize it  She reported pt used device to communicate "I want to go to sleep", to express yes/no 5-6 times/day  Dtr also reported she sat down with pt and navigated/personalized device to help pt familiarize self with different Icons       Goal 1  Patient will improve initiation of swallow trigger with use of gustatory/TTS to less then 5 secs ) (to be achieved in 4-6 weeks)     Goal 2: (Patient will improve oral responsiveness/volitional movement of oral structures with use of gusttory/TTS in 50% of opps ) (to be achieved in 4-6 weeks)    Goal 3: Patient will demonstrate functional use of AAC device across 2-4 therapy sessions with min cues provided  Targeted    Clinician asked pt basic questions: (What is your name, are you feeling tired today, What happened to you) He was able to utilize device and answered questions (used icons) independently with 100% accuracy  Pt was then asked to type answers, he was asked to finish a phrase  He completed this task with 70% accuracy, he required mod verbal cues during task to increase accuracy to 100%  Pt noted to have a difficult time finding letters on the keyboard and pressing letters  He was provided with model and was given "pen" to select letters, this helped pt greatly  Goal 4: Patient's caregiver will demonstrate ability to program AAC device - adding new icons, etc with 100% acc   Targeted     Given clinician model pt's dtr added descriptions to family icons, she required minimal verbal cues during task  Dtr reported difficulty taking pictures and adding them to family icons, clinician showed dtr how to add pictures to icons and asked her to do it independently 4x during session, she did so in all opp  Goal 5: (Patient will correctly identify a related phrase or sentence when given a picture in 80% of opps  ): (to be achieved in 4-6 weeks) GOAL MET     Goal 6: (Patient will write words correctly in 80% of opps when verbally presented ): (to be achieved in 4-6 weeks)-GOAL MET     Goal 7: (Patient will write the name of a visually presented object in 80% of opps ) (to be achieved in 4-6eks) GOAL MET  Goal 8: (Patient will match picture to word out of a field of 10 ) (to be achieved in 4-6 weeks)GOAL MET     Long-term goals:   1  Patient will trial Lingraphica AAC device for 30 days to improve expressive communication by discharge  2  Patient will improve swallow function to moderate to improve potential for po and reduce the risk of aspiration, malnutrition and dehydration  3  Patient will improve ability to communicate wants/needs  Plan:  -Patient was provided with home exercises/activities to target goals in plan of care at the end of today's session       Continue POC

## 2018-02-13 NOTE — PROGRESS NOTES
Daily Note     Today's date: 2018  Patient name: Nancy Gaming  : 1950  MRN: [id number]  Referring provider: LUCIA Guadalupe  Dx:   Encounter Diagnosis   Name Primary?  Cerebrovascular accident (CVA), unspecified mechanism ([facility] [address] ) Yes        Session supervised by ÁLVARO Landaverde/L  Subjective: Pt remained nonverbal throughout session but gestured with thumbs up, nodding, and smiling  Objective: See treatment diary below      Assessment: Tolerated treatment well  Patient tolerated BIONESS on R hand flex/ext neuro re-ed mode for 10 min  Utilized UE ranger to perform FF, horizontal ABD/ADD, and scaption  2lb tbar to perform ceiling and forward presses, req mod assist to sustain elbow extension  Utilizing reacher to facilitate sustained grasp of R hand and proximal stability, pt engaged in hand-to-target task using blocks and cones  Self-assisted with L hand and req frequent cues to grasp with R hand  K-tape applied to R shoulder/scap and back to facilitate postural control and reduce winging         INTERVENTION COMMENTS:  Diagnosis: L MCA CVA, vascular dementia  Precautions: PEG, fall risk, AD, aphasia, vascular dementia  Insurance: [employer] [address]  Visit  PN due 3/6/2018

## 2018-02-15 ENCOUNTER — OFFICE VISIT (OUTPATIENT)
Dept: SPEECH THERAPY | Facility: CLINIC | Age: 68
End: 2018-02-15
Payer: COMMERCIAL

## 2018-02-15 ENCOUNTER — OFFICE VISIT (OUTPATIENT)
Dept: OCCUPATIONAL THERAPY | Facility: CLINIC | Age: 68
End: 2018-02-15
Payer: COMMERCIAL

## 2018-02-15 ENCOUNTER — OFFICE VISIT (OUTPATIENT)
Dept: PHYSICAL THERAPY | Facility: CLINIC | Age: 68
End: 2018-02-15
Payer: COMMERCIAL

## 2018-02-15 DIAGNOSIS — I69.30 CHRONIC ISCHEMIC LEFT MCA STROKE: Primary | ICD-10-CM

## 2018-02-15 DIAGNOSIS — I63.9 CEREBROVASCULAR ACCIDENT (CVA), UNSPECIFIED MECHANISM (HCC): Primary | ICD-10-CM

## 2018-02-15 DIAGNOSIS — I69.320 APHASIA AS LATE EFFECT OF CEREBROVASCULAR ACCIDENT: ICD-10-CM

## 2018-02-15 DIAGNOSIS — R13.10 DYSPHAGIA, UNSPECIFIED TYPE: Primary | ICD-10-CM

## 2018-02-15 DIAGNOSIS — I69.320 APHASIA AS LATE EFFECT OF CEREBROVASCULAR ACCIDENT (CVA): ICD-10-CM

## 2018-02-15 PROCEDURE — 92526 ORAL FUNCTION THERAPY: CPT

## 2018-02-15 PROCEDURE — 97530 THERAPEUTIC ACTIVITIES: CPT

## 2018-02-15 PROCEDURE — 97112 NEUROMUSCULAR REEDUCATION: CPT

## 2018-02-15 PROCEDURE — 97110 THERAPEUTIC EXERCISES: CPT

## 2018-02-15 PROCEDURE — 92507 TX SP LANG VOICE COMM INDIV: CPT

## 2018-02-15 NOTE — PROGRESS NOTES
Daily Speech Treatment Note    Primary Diagnosis/Billing code: I69 5, I69 391  Secondary Diagnosis/ Billing code: I63 9    Visit Tracking:  -Visit #13   -CBC  ($30 co-pay - Francis Carney)  -RE due 3/22/2018  Short-term goals:     Subjective/Behavioral: 1:1 ST x 60 minutes  Co Tx session with Estefani Go Ms, CCC-SLP    Pt arrived to facility with daughter and grandson  Dtr reported pt had not been feeling well since 2/13/2018, he expressed through gestures and yes/no questions he was feeling dizzy and tired, dtr reported she noticed changes when pt began taking Donepezil HCL 10mg, medication prescribed by neurologist  Clinician and dtr relayed information to neurologist via [facility] [place]  Neurologist responded promptly and suggested to discontinue use of medication  Objective/Assessment:  HEP: Dtr reported pt was unable to complete HEP or use device  He was sick and spent the day in bed  Goal 1  Patient will improve initiation of swallow trigger with use of gustatory/TTS to less then 5 secs ) (to be achieved in 4-6 weeks)  Traditional VitalStim    Channel(s) used: 1,2   Placement: 2a   Duty Cycle: continuous   Frequency: 80 pulses per second   Phase Duration: 300 microseconds   Treatment Duration: 5 minutes   Pt first time using NMES on anterior neck  Only able to tolerate 5 min  Initiated swallow x3 with success  Pt reporting only "needles" sensation up to 5mA  He did not tolerate more mA      Goal 2: (Patient will improve oral responsiveness/volitional movement of oral structures with use of gusttory/TTS in 50% of opps ) (to be achieved in 4-6 weeks)    Goal 3: Patient will demonstrate functional use of AAC device across 2-4 therapy sessions with min cues provided  Clinician asked pt basic questions: (What is your name, are you feeling tired today, What happened to you) He was able to utilize device and answered questions (used icons) independently with 100% accuracy   Pt was then asked to name grandson through the use of device  Pt required assistance to navigate to "family" icon, once there he independently selected icon of his grandson  Pt was then asked  "how are you feeling?" pt responded by using icon "I am feeling tired"  Pt was then asked to type answers using keyboard on device, He independently  wrote "vamonos (let's go)  Pt was asked why he wanted to leave, he independently typed "I miss my family"  Pt was then asked to type "ABC's" to familiarize self with keyboard, he completed task with 68% accuracy, he required max verbal cues and assistance to increase accuracy to 100%  Pt noted to be less alert during session today, he continued to report he was feeling tired through the use of icon and through gestures  Pt's blood pressure was take by physical therapist, blood pressure was normal      Goal 4: Patient's caregiver will demonstrate ability to program AAC device - adding new icons, etc with 100% acc  Dtr reported she added and updated family pictures and descriptions on icons  Goal 5: (Patient will correctly identify a related phrase or sentence when given a picture in 80% of opps  ): (to be achieved in 4-6 weeks) GOAL MET     Goal 6: (Patient will write words correctly in 80% of opps when verbally presented ): (to be achieved in 4-6 weeks)-GOAL MET     Goal 7: (Patient will write the name of a visually presented object in 80% of opps ) (to be achieved in 4-6eks) GOAL MET  Goal 8: (Patient will match picture to word out of a field of 10 ) (to be achieved in 4-6 weeks)GOAL MET     Long-term goals:   1  Patient will trial Lingraphica AAC device for 30 days to improve expressive communication by discharge  2  Patient will improve swallow function to moderate to improve potential for po and reduce the risk of aspiration, malnutrition and dehydration  3  Patient will improve ability to communicate wants/needs      Plan:  -Patient was provided with home exercises/activities to target goals in plan of care at the end of today's session       Continue POC

## 2018-02-15 NOTE — PROGRESS NOTES
Daily Note     Today's date: 2/15/2018  Patient name: Charline Christine  : 1950  MRN: [id number]  Referring provider: LUCIA Hedrick  Dx:   Encounter Diagnoses   Name Primary?  Chronic ischemic left MCA stroke Yes    Aphasia as late effect of cerebrovascular accident                   Subjective: Patient with no new complaints, reports fatigue following speech and occupational therapy  Objective: See treatment diary below  Precautions: Aphasia, understands only Romanian, falls    Daily Treatment Diary       Exercise Diary  2/15       nustep          Step ups-6'' 15x ea with cane          Alt step taps-6''           Side stepping 1 lap         Stepping over hurdles          Gait training-NBQC with tape marks for step length in solo no tape, verbal cues for increased step length 40 ft, 80 ft        STS 10x L foot on 4", B UE       LAQ            HR/TR            TM            Gait training- no AD            Alt heel taps-''            marches            Gait training - // bars with L UE only                                                                                                   Assessment: Tolerated treatment well with mild improvement in energy this session  Patient completed step ups with a SBQC in the left hand demonstrating increased difficulty with the right LE and lacking eccentric control  Patient continues to be hesitant with performing exercises with no UE support  Attempted weight bearing through the right UE during STS, requiring frequent cues to maintain  Patient demonstrated fatigue post treatment and would benefit from continued PT  Plan: Continue per plan of care

## 2018-02-15 NOTE — PROGRESS NOTES
Occupational Therapy Daily Note     Today's date: 2/15/2018  Patient name: Nitin Camejo  : 1950  MRN: [id number]  Referring provider: LUCIA Marshall  Dx:   Encounter Diagnosis   Name Primary?  Cerebrovascular accident (CVA), unspecified mechanism ([facility] ) Yes     Subjective: "No"    Objective: See treatment diary below    Assessment: Pt seen for OT treatment session focusing on Bioness for prerequisite neuro-modulation, 4 reps, 10 repetitions at a time for overhead shoulder flexion, trialed 3lb therbar, downgraded to 2lb therabar w/ max verbal cues and mirror feedback for appropriate body alignment and adequate thoracic extension and RUE shoulder flexion 2* proximal hemiparesis and pronator drift requiring moderate manual cues to facilitate seated at edge of mat  Pt engaged in moderate sized ball toss w/ grandson w/ B/L integration for 10 minutes, engaged in basketball toss w/ B/L integration for overhead reaching w/ moderate difficulty  Pt participated in the UE ranger seated at edge of mat w/ grandson providing field target for target retrieval w/ RUE scaption and scapular mobilizations forward, backward and B/L peripheral field target retrieval attempts  Pt engaged in LUE to facilitate gravity elimination for RUE fx'l forward flexion reaching to place 20 colored resistance clips on theraband then off theraband, trialed yellow, progressed to red, and trialed blue resistance clips w/ moderate difficulty   Pt continues to demonstrate the following occupational deficits: limited 2* Belarusian speaking only, minimally verbal, expressive>receptive aphasia, RUE/RLE hemiparesis, decreased RLE advancement, w/ associated buckling, RLE mixed tone proximal hypertonicity distal hypotonicity, RUE proximal hypertonicity distal hypotonicity w/ pronator drift, dysarthria, dysphagia, R facial droop, R lateral lean, poor STM/immediate delayed recall, oriented to self only disoriented to remainder,  poor RUE distal prehension, impaired FMC/FMS/GMC/GMS, fall risk, decreased endurance/activity tolerance, generalized weakness, deconditioning, decreased insight judgment safety and awareness into deficits  Tolerated treatment well  Patient would benefit from continued skilled OT  Plan: Continued skilled OT per POC with focus on the following:  Planned Therapy Interventions:  ADL:  Adaptive equipment education  LB dressing  UB dressing  Writing     Neuro:  Bioness fitting  IASTM/Graston  FMC/prehension  Seated functional reach: crossing midline  Sidelying scap mobs  Supine/sidelying, seated neuro re-education  Splinting to be established PRN     Modalities:  NMES to tricep for longer lever and/or BIONESS for distal NMES  Fluido for sensory re-ed x10 min     INTERVENTION COMMENTS:  Diagnosis: L MCA CVA, vascular dementia  Precautions: PEG, fall risk, AD, aphasia, vascular dementia  FOTO: 12% with 88% limitation  Insurance: [employer] [facility] Rep  Visit 1 of 8 PN due 3/6/2018      Thank you for the consult!   Please call if you have any questions: [phone]  DOMINIC Hook, OTR/L, C-GCM, CSRS  Director of Outpatient Neuro Occupational Therapy

## 2018-02-20 ENCOUNTER — APPOINTMENT (OUTPATIENT)
Dept: PHYSICAL THERAPY | Facility: CLINIC | Age: 68
End: 2018-02-20
Payer: COMMERCIAL

## 2018-02-20 ENCOUNTER — APPOINTMENT (OUTPATIENT)
Dept: OCCUPATIONAL THERAPY | Facility: CLINIC | Age: 68
End: 2018-02-20
Payer: COMMERCIAL

## 2018-02-20 ENCOUNTER — APPOINTMENT (OUTPATIENT)
Dept: SPEECH THERAPY | Facility: CLINIC | Age: 68
End: 2018-02-20
Payer: COMMERCIAL

## 2018-02-20 NOTE — PROGRESS NOTES
Occupational Therapy Daily Note     Today's date: 2018  Patient name: Nitin Camejo  : 1950  MRN: [id number]  Referring provider: LUCIA Marshall  Dx: No diagnosis found  Subjective: ***    Objective: See treatment diary below    Assessment:   Pt continues to demonstrate the following occupational deficits: limited 2* Danish speaking only, minimally verbal, expressive>receptive aphasia, RUE/RLE hemiparesis, decreased RLE advancement, w/ associated buckling, RLE mixed tone proximal hypertonicity distal hypotonicity, RUE proximal hypertonicity distal hypotonicity w/ pronator drift, dysarthria, dysphagia, R facial droop, R lateral lean, poor STM/immediate delayed recall, oriented to self only disoriented to remainder,  poor RUE distal prehension, impaired FMC/FMS/GMC/GMS, fall risk, decreased endurance/activity tolerance, generalized weakness, deconditioning, decreased insight judgment safety and awareness into deficits  Tolerated treatment well  Patient would benefit from continued skilled OT  Tolerated treatment well  Patient would benefit from continued skilled OT  Plan: Continued skilled OT per POC with focus on the following:  Planned Therapy Interventions:  ADL:  Adaptive equipment education  LB dressing  UB dressing  Writing     Neuro:  Bioness fitting  IASTM/Graston  FMC/prehension  Seated functional reach: crossing midline  Sidelying scap mobs  Supine/sidelying, seated neuro re-education  Splinting to be established PRN     Modalities:  NMES to tricep for longer lever and/or BIONESS for distal NMES  Fluido for sensory re-ed x10 min     INTERVENTION COMMENTS:  Diagnosis: L MCA CVA, vascular dementia  Precautions: PEG, fall risk, AD, aphasia, vascular dementia  FOTO: 12% with 88% limitation  Insurance: [employer] [facility] Rep  Visit 2 of 8 PN due 3/6/2018      Thank you for the consult!   Please call if you have any questions: [phone]  MAURA LOAIZA [facility], OTD, OTR/L, C-LUIS, CSRS  Director of Outpatient Neuro Occupational Therapy

## 2018-02-22 ENCOUNTER — OFFICE VISIT (OUTPATIENT)
Dept: OCCUPATIONAL THERAPY | Facility: CLINIC | Age: 68
End: 2018-02-22
Payer: COMMERCIAL

## 2018-02-22 ENCOUNTER — OFFICE VISIT (OUTPATIENT)
Dept: PHYSICAL THERAPY | Facility: CLINIC | Age: 68
End: 2018-02-22
Payer: COMMERCIAL

## 2018-02-22 ENCOUNTER — OFFICE VISIT (OUTPATIENT)
Dept: SPEECH THERAPY | Facility: CLINIC | Age: 68
End: 2018-02-22
Payer: COMMERCIAL

## 2018-02-22 DIAGNOSIS — G81.91 RIGHT HEMIPARESIS (HCC): ICD-10-CM

## 2018-02-22 DIAGNOSIS — I69.320 APHASIA AS LATE EFFECT OF CEREBROVASCULAR ACCIDENT: Primary | ICD-10-CM

## 2018-02-22 DIAGNOSIS — R13.10 DYSPHAGIA, UNSPECIFIED TYPE: ICD-10-CM

## 2018-02-22 DIAGNOSIS — I63.9 CEREBROVASCULAR ACCIDENT (CVA), UNSPECIFIED MECHANISM (HCC): Primary | ICD-10-CM

## 2018-02-22 DIAGNOSIS — I69.30 CHRONIC ISCHEMIC LEFT MCA STROKE: Primary | ICD-10-CM

## 2018-02-22 DIAGNOSIS — I69.320 APHASIA AS LATE EFFECT OF CEREBROVASCULAR ACCIDENT: ICD-10-CM

## 2018-02-22 PROCEDURE — 97535 SELF CARE MNGMENT TRAINING: CPT

## 2018-02-22 PROCEDURE — 92526 ORAL FUNCTION THERAPY: CPT

## 2018-02-22 PROCEDURE — 97112 NEUROMUSCULAR REEDUCATION: CPT

## 2018-02-22 PROCEDURE — 97150 GROUP THERAPEUTIC PROCEDURES: CPT

## 2018-02-22 PROCEDURE — 97110 THERAPEUTIC EXERCISES: CPT

## 2018-02-22 PROCEDURE — 92507 TX SP LANG VOICE COMM INDIV: CPT

## 2018-02-22 NOTE — PROGRESS NOTES
Daily Note     Today's date: 2018  Patient name: Sandy Solano  : 1950  MRN: [id number]  Referring provider: LUCIA Youngblood  Dx:   Encounter Diagnosis     ICD-10-CM    1  Cerebrovascular accident (CVA), unspecified mechanism ([facility] ) I63 9            Session supervised by ÁLVARO Bryan/L  Subjective: Pt remained nonverbal throughout session, gestured with thumbs up  Objective: See treatment diary below      Assessment: Tolerated treatment well  Patient tolerated BIONESS on R hand flex/ext neuro re-ed mode for 10 min  Utilized UE ranger to perform FF, horizontal ABD/ADD, and scaption  1lb tbar to perform ceiling and forward presses, req mod assist to sustain elbow extension and physical cues for postural control  Pt engaged in hand-to-target tasking utilizing foam blocks with tongs, focus on sustained grasp and proximal stability  Utilized L hand to assist, switched to using L hand by end of session - pt gestured "no more" type motion when OTAS encouraged pt to use R hand  Plan: Continue skilled OT per POC         INTERVENTION COMMENTS:  Diagnosis: L MCA CVA, vascular dementia  Precautions: PEG, fall risk, AD, aphasia, vascular dementia  FOTO: 12% with 88% limitation  Insurance: [facility] Rep  Visit 2 of 8 PN due 3/6/2018

## 2018-02-22 NOTE — PROGRESS NOTES
Daily Note     Today's date: 2018  Patient name: Marco Lynn  : 1950  MRN: [id number]  Referring provider: LUCIA Guerra  Dx:   Encounter Diagnoses   Name Primary?  Chronic ischemic left MCA stroke Yes    Aphasia as late effect of cerebrovascular accident     Right hemiparesis ([facility] )                   Subjective: Patient stopped taking medication that was causing him to be tired  Objective: See treatment diary below  Precautions: Aphasia, understands only Syriac, falls    Daily Treatment Diary       Exercise Diary  2/15 2/22      nustep   12 min       Step ups-6'' 15x ea with cane   10 x ea side  // bars       Alt step taps-6''  10x         Side stepping 1 lap  1 lap       Stepping over hurdles          Gait training-NBQC with tape marks for step length in solo no tape, verbal cues for increased step length 40 ft, 80 ft        STS 10x L foot on 4", B UE 10x L foot on   4"step  B UE      LAQ            HR/TR            TM            Gait training- no AD            Alt heel taps-6''            marches    20 x        Gait training - // bars with L UE only    1 lap                                                                                               Assessment: Pt was grasping L LE during treatment today  Poor eccentric control on STS  Patient demonstrated fatigue post treatment and would benefit from continued PT  1:1 1235-100, group [id number], self directed [id number]      Plan: Continue per plan of care

## 2018-02-22 NOTE — PROGRESS NOTES
Daily Speech Treatment Note    Primary Diagnosis/Billing code: I69 320, I69 391  Secondary Diagnosis/ Billing code: I63 9    Visit Tracking:  -Visit #14  -CBC  ($30 co-pay - Lorie Salazar)  -RE due 3/22/2018  Short-term goals:     Subjective/Behavioral: 1:1 ST x 60 minutes  Pt arrived to facility with daughter  Dtr reported pt's mood and overall health improved once the pill was discontinued  Objective/Assessment:  HEP: Dtr reported pt was able to use Lingraphica device consistently for 5-10 minutes daily  Pt communicated using the device by answering yes/no questions, typing messages, expressing "i am tired" through the use of icon, accessed family icons to call members and lastly used "i had a stroke" Icon when asked "what happened to you?" Pt sat with device throughout the day and navigated through the different folders  Goal 1  Patient will improve initiation of swallow trigger with use of gustatory/TTS to less then 5 secs ) (to be achieved in 4-6 weeks)    Performed TT/G stimulation exercises using lemon swabs  ( placed swabs on anterior faucial pillars and lingual surface with cues for fast swallow ) Patient was noted to have increase in swallow trigger time in approx 6/8 trials however still delayed in all instances  No overt distress or s/s of aspiration today  Pt noted to have decreased drooling during session today  Pt was then instructed to imitate oral motor movements from clinician (Open and close mouth) task completed in 8/8 opp  He required min verbal cues and clinician model during task  Goal 2: (Patient will improve oral responsiveness/volitional movement of oral structures with use of gusttory/TTS in 50% of opps ) (to be achieved in 4-6 weeks)    Goal 3: Patient will demonstrate functional use of AAC device across 2-4 therapy sessions with min cues provided       Clinician asked pt basic questions: (are you feeling tired today, Who is here with you today?) He was able to utilize device and answered questions (used icons) independently with 100% accuracy  During the first 15 minutes of therapy session device ran out of battery and pt forgot to bring  in, due to this we were unable to continue using the device  Clinician then utilized Language natalya  On  iPad  Pt was instructed to "spell what he hears", task completed in 5/8 opp independently, he required mod verbal cues to increase success to 100%  Goal 4: Patient's caregiver will demonstrate ability to program AAC device - adding new icons, etc with 100% acc  Dtr reported pt and son spent 15-20 minutes navigating through device and adding more family pictures to icons  Goal 5: (Patient will correctly identify a related phrase or sentence when given a picture in 80% of opps  ): (to be achieved in 4-6 weeks) GOAL MET     Goal 6: (Patient will write words correctly in 80% of opps when verbally presented ): (to be achieved in 4-6 weeks)-GOAL MET     Goal 7: (Patient will write the name of a visually presented object in 80% of opps ) (to be achieved in 4-6eks) GOAL MET  Goal 8: (Patient will match picture to word out of a field of 10 ) (to be achieved in 4-6 weeks)GOAL MET     Long-term goals:   1  Patient will trial Lingraphica AAC device for 30 days to improve expressive communication by discharge  2  Patient will improve swallow function to moderate to improve potential for po and reduce the risk of aspiration, malnutrition and dehydration  3  Patient will improve ability to communicate wants/needs  Plan:  -Patient was provided with home exercises/activities to target goals in plan of care at the end of today's session       Continue POC

## 2018-02-26 ENCOUNTER — HOSPITAL ENCOUNTER (OUTPATIENT)
Dept: RADIOLOGY | Facility: HOSPITAL | Age: 68
Discharge: HOME | End: 2018-02-26
Attending: PSYCHIATRY & NEUROLOGY
Payer: COMMERCIAL

## 2018-02-26 DIAGNOSIS — R13.10 DYSPHAGIA, UNSPECIFIED TYPE: ICD-10-CM

## 2018-02-26 PROCEDURE — 92611 MOTION FLUOROSCOPY/SWALLOW: CPT

## 2018-02-26 PROCEDURE — G8996 SWALLOW CURRENT STATUS: HCPCS

## 2018-02-26 PROCEDURE — G8997 SWALLOW GOAL STATUS: HCPCS

## 2018-02-26 PROCEDURE — 74230 X-RAY XM SWLNG FUNCJ C+: CPT

## 2018-02-26 PROCEDURE — G8998 SWALLOW D/C STATUS: HCPCS

## 2018-02-26 NOTE — PROGRESS NOTES
Occupational Therapy Daily Note     Today's date: 2018  Patient name: Jonnathan Curry  : 1950  MRN: [id number]  Referring provider: LUCIA Acosta  Dx:   Encounter Diagnosis   Name Primary?  Cerebrovascular accident (CVA), unspecified mechanism ([facility] ) Yes       Subjective: "I'm tired today"  Objective: See treatment diary below    Assessment: Pt seen for OT treatment session focusing on supine Bioness neuro modulation neuro re-education for prerequisite task in tandem w/ 4" weighted ball for B/L integration for overhead fx'l reaching per 20 repetitions w/ 1 minute rest break, mod concrete simple one step cues for cog support w/ repetition 2* frequently utilizing LUE only w/ cues to incorporate RUE for B/L integration  Pt required SBA for bed mobility for supine to sit @ edge of mat, pt engaged in 2" yellow therabar for overhead B/L integrated overhead shoulder flexion w/ visual feedback via mirror w/ moderate repetitive cues to increase awareness for appropriate body mechanics w/ G alignment for RUE, engaged in 20 repetitions w/ 1 minute rest break between sets  Pt engaged in bean bag toss w/ RUE seated at edge of mat for dynamic sitting balance and posterior shoulder extension w/ RUE to retrieve bean bags and toss into laundry basket in far R peripheral field  Pt engaged in colored wooden pegs for placement onto tabletop peg board w/ RUE for modified tripod cylindrical grasp w/ LUE to assist for gravity eliminated proximal support  Pt engaged in one handed dressing techniques w/ donning/doffing coat seated pre and post session w/ min A   Pt continues to demonstrate the following occupational deficits: limited 2* Slovenian speaking only, minimally verbal, expressive>receptive aphasia, RUE/RLE hemiparesis, decreased RLE advancement, w/ associated buckling, RLE mixed tone proximal hypertonicity distal hypotonicity, RUE proximal hypertonicity distal hypotonicity w/ pronator drift, dysarthria, dysphagia, R facial droop, R lateral lean, poor STM/immediate delayed recall, oriented to self only disoriented to remainder,  poor RUE distal prehension, impaired FMC/FMS/GMC/GMS, fall risk, decreased endurance/activity tolerance, generalized weakness, deconditioning, decreased insight judgment safety and awareness into deficits  Tolerated treatment well  Patient would benefit from continued skilled OT  Plan: Continued skilled OT per POC with focus on improving RUE FMC/FMS/prehension patterns, functional use, scaption, and R sided awareness  INTERVENTION COMMENTS:  Diagnosis: L MCA CVA, vascular dementia  Precautions: PEG, fall risk, AD, aphasia, vascular dementia  FOTO: 12% with 88% limitation  Insurance: [employer] [facility] Rep  Visit 3 of 8 PN due 3/6/2018      Thank you for the consult!   Please call if you have any questions: [phone]  DOMINIC Gilliland, OTR/L, C-LUIS, CSRS  Director of Outpatient Neuro Occupational Therapy

## 2018-02-26 NOTE — PROCEDURES
Video Swallow Study      Patient Name: Dago Frost  NMGJZ'K Date: 2/26/2018        Past Medical History  No past medical history on file  Past Surgical History  No past surgical history on file  This is a 69 year referred for a VBS s/p left MCA stroke April 2017, facial palsy who currently has severe expressive aphasia and severe oropharyngeal dysphagia  Pt currently see outpatient SLP for speech/swallowing  He is currently being trained in use of AAC for communication and in regards to his dysphagia, SLP is performing TT/G stimulation exercises using lemon swabs and imitation of  oral motor movements from clinician (per notes from sessions) but continues to not be safe for PO trials  Pt has PEG tube as primary source of nutrition  Previous VBS:  No  Current Diet:   NPO  Premorbid diet:  Presumed reg/thin  Dentition:  adequate  O2 requirement:  RA  Oral mech:  Strength and ROM:  Unable to assess as pt unable to follow commands for oral motor assessment  Vocal Quality/Speech:  Severe expressive aphasia  Cognitive status:  impaired    Consistencies administered: Barium laden applesauce, honey thick  Liquids were administered by syringe  Pt was seated laterally at 90 degrees  Oral stage:  WNL  Lip closure: incomplete  Mastication: n/a  Bolus formation: absent  Bolus control: absent  Transfer: absent  Residue: SEVERE    Pharyngeal stage:  ABSENT    Screening of Esophageal stage:  Screening not completed as no material was transferred posteriorly to pass through the esophagus    Summary:  Pt presents c SEVERE oropharyngeal dysphagia characterized by inability to retrieve bolus from spoon, perform any bolus formation or movement of bolus and no attempts to transfer bolus posteriorly  As bolus thinned out with oral secretions, pt noted to drop head c anterior spillage of bolus   Utilized a syringe c HTL to place bolus more posteriorly on tongue base c no improvement in awareness/attempts to manage bolus or transfer  Pt noted to have material at the tongue base with no initiation of swallow  Pt verbally cued in German to swallow as well as stick tongue out but pt unable to follow commands  Material suctioned from mouth through out study  Utilizing a [address] speaking colleague, discussed results of VBS c pt's daughter and recommendation for continued speech language therapy at outpatient ([address] [place] [address])  Given the lack of progress in return of swallow function and the length of time since patient stroke, question the likelihood of pt's swallow function returning   Will defer recommendations for further dysphagia therapy to referring MD      Recommendations:  Diet: STRICT NPO  Liquids:  Meds: PEG tube  Strategies:  Upright position  F/u ST tx: per MD  Therapy Prognosis: poor  Prognosis considerations: lack of progress/return of swallow function, length of time since CVA  Aspiration Precautions  Reflux Precautions  Results reviewed with: pt, family

## 2018-02-27 ENCOUNTER — OFFICE VISIT (OUTPATIENT)
Dept: PHYSICAL THERAPY | Facility: CLINIC | Age: 68
End: 2018-02-27
Payer: COMMERCIAL

## 2018-02-27 ENCOUNTER — OFFICE VISIT (OUTPATIENT)
Dept: OCCUPATIONAL THERAPY | Facility: CLINIC | Age: 68
End: 2018-02-27
Payer: COMMERCIAL

## 2018-02-27 ENCOUNTER — OFFICE VISIT (OUTPATIENT)
Dept: SPEECH THERAPY | Facility: CLINIC | Age: 68
End: 2018-02-27
Payer: COMMERCIAL

## 2018-02-27 DIAGNOSIS — I69.30 CHRONIC ISCHEMIC LEFT MCA STROKE: Primary | ICD-10-CM

## 2018-02-27 DIAGNOSIS — G81.91 RIGHT HEMIPARESIS (HCC): ICD-10-CM

## 2018-02-27 DIAGNOSIS — I63.9 CEREBROVASCULAR ACCIDENT (CVA), UNSPECIFIED MECHANISM (HCC): Primary | ICD-10-CM

## 2018-02-27 DIAGNOSIS — I69.320 APHASIA AS LATE EFFECT OF CEREBROVASCULAR ACCIDENT: ICD-10-CM

## 2018-02-27 DIAGNOSIS — I69.320 APHASIA AS LATE EFFECT OF CEREBROVASCULAR ACCIDENT: Primary | ICD-10-CM

## 2018-02-27 PROCEDURE — 97110 THERAPEUTIC EXERCISES: CPT

## 2018-02-27 PROCEDURE — 97112 NEUROMUSCULAR REEDUCATION: CPT

## 2018-02-27 PROCEDURE — 92507 TX SP LANG VOICE COMM INDIV: CPT

## 2018-02-27 NOTE — PROGRESS NOTES
Daily Speech Treatment Note    Primary Diagnosis/Billing code: I69 320, I69 391  Secondary Diagnosis/ Billing code: I63 9    Visit Tracking:  -Visit #15  -CBC  ($30 co-pay - Risa Porter)  -RE due 3/22/2018  Short-term goals:     Subjective/Behavioral: 1:1 ST x 60 minutes  Pt arrived to facility with daughter  He participated weill during session  Objective/Assessment:  HEP: Dtr reported pt was able to use Lingraphica device consistently throughout the day 4  days of the week  Dtr also reported they will be moving back to [facility] end of the month of March or beginning of the month of April  She will let this clinician know final decision regarding date they will be leaving  on the next scheduled speech therapy session 2/29/18    Goal 1  Patient will improve initiation of swallow trigger with use of gustatory/TTS to less then 5 secs ) (to be achieved in 4-6 weeks)  DNT, last session:  Performed TT/G stimulation exercises using lemon swabs  ( placed swabs on anterior faucial pillars and lingual surface with cues for fast swallow ) Patient was noted to have increase in swallow trigger time in approx 6/8 trials however still delayed in all instances  No overt distress or s/s of aspiration today  Pt noted to have decreased drooling during session today  Pt was then instructed to imitate oral motor movements from clinician (Open and close mouth) task completed in 8/8 opp  He required min verbal cues and clinician model during task  Goal 2: (Patient will improve oral responsiveness/volitional movement of oral structures with use of gusttory/TTS in 50% of opps ) (to be achieved in 4-6 weeks)    Goal 3: Patient will demonstrate functional use of AAC device across 2-4 therapy sessions with min cues provided  Clinician instructed pt to navigate through device and find specific icons  He required max verbal cues and clinician assistance in 8/10 opp   Pt was asked questions related to activities of daily living, he was able to answer questions using device in 7/10 opp  He required mod verbal cues during task  Pt was then asked basic questions (how are you feeling today? Did you have a good weekend etc ) He used the device and answered questions independently with 100% acc  Goal 4: Patient's caregiver will demonstrate ability to program AAC device - adding new icons, etc with 100% acc  Dtr provided more information regarding new icons she wanted to add to device  Given minimal assistance and clinician model 1x she independently added folder, 10 icons and  pictures for icons  Goal 5: (Patient will correctly identify a related phrase or sentence when given a picture in 80% of opps  ): (to be achieved in 4-6 weeks) GOAL MET     Goal 6: (Patient will write words correctly in 80% of opps when verbally presented ): (to be achieved in 4-6 weeks)-GOAL MET     Goal 7: (Patient will write the name of a visually presented object in 80% of opps ) (to be achieved in 4-6eks) GOAL MET  Goal 8: (Patient will match picture to word out of a field of 10 ) (to be achieved in 4-6 weeks)GOAL MET     Long-term goals:   1  Patient will trial Lingraphica AAC device for 30 days to improve expressive communication by discharge  2  Patient will improve swallow function to moderate to improve potential for po and reduce the risk of aspiration, malnutrition and dehydration  3  Patient will improve ability to communicate wants/needs  Plan:  -Patient was provided with home exercises/activities to target goals in plan of care at the end of today's session       Continue POC

## 2018-02-27 NOTE — PROGRESS NOTES
Daily Note     Today's date: 2018  Patient name: Brian Trinh  : 1950  MRN: [id number]  Referring provider: LUCIA Vidal  Dx:   Encounter Diagnoses   Name Primary?  Chronic ischemic left MCA stroke Yes    Aphasia as late effect of cerebrovascular accident     Right hemiparesis ([facility] )                   Subjective: Patient has no new complaints    Objective: See treatment diary below  Precautions: Aphasia, understands only Scottish, falls    Daily Treatment Diary       Exercise Diary  2/15 2/22 2/27     nustep   12 min       Step ups-6'' 15x ea with cane   10 x ea side  // bars 10 x ea bonnie  // bars      Alt step taps-6''  10x  10x       Side stepping 1 lap  1 lap  3 laps     Stepping over hurdles   3 laps       Gait training-NBQC with tape marks for step length in solo no tape, verbal cues for increased step length 40 ft, 80 ft        STS 10x L foot on 4", B UE 10x L foot on   4"step  B UE 10x     LAQ            HR/TR            TM            Gait training- no AD            Alt heel taps-6''            High knees    20 x  20 x      Gait training - // bars with L UE only    1 lap  3 laps                    high knee marches      2 laps                                                                   Assessment: Pt demonstrates slowness w/ movement  Able to raise knees high today and have step through gait on // bars  Patient demonstrated fatigue post treatment and would benefit from continued PT  Plan: Continue per plan of care

## 2018-03-01 ENCOUNTER — APPOINTMENT (OUTPATIENT)
Dept: SPEECH THERAPY | Facility: CLINIC | Age: 68
End: 2018-03-01
Payer: COMMERCIAL

## 2018-03-01 ENCOUNTER — APPOINTMENT (OUTPATIENT)
Dept: OCCUPATIONAL THERAPY | Facility: CLINIC | Age: 68
End: 2018-03-01
Payer: COMMERCIAL

## 2018-03-02 DIAGNOSIS — I10 ESSENTIAL HYPERTENSION, BENIGN: ICD-10-CM

## 2018-03-02 DIAGNOSIS — F02.80 ALZHEIMER'S DEMENTIA WITHOUT BEHAVIORAL DISTURBANCE, UNSPECIFIED TIMING OF DEMENTIA ONSET (HCC): ICD-10-CM

## 2018-03-02 DIAGNOSIS — G30.9 ALZHEIMER'S DEMENTIA WITHOUT BEHAVIORAL DISTURBANCE, UNSPECIFIED TIMING OF DEMENTIA ONSET (HCC): ICD-10-CM

## 2018-03-02 DIAGNOSIS — I69.320 APHASIA AS LATE EFFECT OF CEREBROVASCULAR ACCIDENT: ICD-10-CM

## 2018-03-02 DIAGNOSIS — E78.5 HYPERLIPIDEMIA, UNSPECIFIED HYPERLIPIDEMIA TYPE: Primary | ICD-10-CM

## 2018-03-02 PROBLEM — F01.50 VASCULAR DEMENTIA WITHOUT BEHAVIORAL DISTURBANCE (HCC): Status: ACTIVE | Noted: 2018-01-17

## 2018-03-02 PROBLEM — R05.9 COUGH: Status: ACTIVE | Noted: 2017-11-29

## 2018-03-02 PROBLEM — B35.1 TOENAIL FUNGUS: Status: ACTIVE | Noted: 2017-12-07

## 2018-03-02 PROBLEM — I73.9 PERIPHERAL ARTERIAL DISEASE (HCC): Status: ACTIVE | Noted: 2018-01-23

## 2018-03-02 PROBLEM — R47.01 APHASIA, MIXED: Status: ACTIVE | Noted: 2018-01-17

## 2018-03-02 PROBLEM — R63.30 FEEDING DIFFICULTIES: Status: ACTIVE | Noted: 2017-11-27

## 2018-03-02 PROBLEM — S05.8X9A: Status: ACTIVE | Noted: 2017-12-21

## 2018-03-02 PROBLEM — E11.9 DIABETES MELLITUS TYPE 2, NONINSULIN DEPENDENT (HCC): Status: ACTIVE | Noted: 2017-11-28

## 2018-03-02 PROBLEM — J06.9 UPPER RESPIRATORY INFECTION WITH COUGH AND CONGESTION: Status: ACTIVE | Noted: 2017-11-27

## 2018-03-02 PROBLEM — R22.1 LOCALIZED SWELLING, MASS OR LUMP OF NECK: Status: ACTIVE | Noted: 2017-12-07

## 2018-03-02 PROBLEM — G81.91 HEMIPARESIS, RIGHT (HCC): Status: ACTIVE | Noted: 2017-11-28

## 2018-03-02 PROBLEM — I63.512 LEFT MIDDLE CEREBRAL ARTERY STROKE (HCC): Status: ACTIVE | Noted: 2018-01-23

## 2018-03-02 PROBLEM — G51.0 FACIAL PALSY: Status: ACTIVE | Noted: 2018-01-17

## 2018-03-02 PROBLEM — D17.1 LIPOMA OF TORSO: Status: ACTIVE | Noted: 2017-12-19

## 2018-03-02 PROBLEM — F80.1 EXPRESSIVE SPEECH DELAY: Status: ACTIVE | Noted: 2017-11-28

## 2018-03-02 RX ORDER — MEMANTINE HYDROCHLORIDE 10 MG/1
10 TABLET ORAL DAILY
Qty: 90 TABLET | Refills: 1 | Status: SHIPPED | OUTPATIENT
Start: 2018-03-02

## 2018-03-02 RX ORDER — DONEPEZIL HYDROCHLORIDE 10 MG/1
10 TABLET, FILM COATED ORAL
Qty: 30 TABLET | Refills: 0 | Status: SHIPPED | OUTPATIENT
Start: 2018-03-02

## 2018-03-02 RX ORDER — DILTIAZEM HYDROCHLORIDE 300 MG/1
300 CAPSULE, EXTENDED RELEASE ORAL DAILY
Qty: 90 CAPSULE | Refills: 1 | Status: SHIPPED | OUTPATIENT
Start: 2018-03-02

## 2018-03-02 RX ORDER — SIMVASTATIN 40 MG
40 TABLET ORAL
Qty: 90 TABLET | Refills: 1 | Status: SHIPPED | OUTPATIENT
Start: 2018-03-02

## 2018-03-02 RX ORDER — NUT.TX.GLUC INTOL,LF,SOY/FIBER 0.06 G-1.2
1 LIQUID (ML) ORAL
Qty: 237 ML | Refills: 0 | Status: SHIPPED | OUTPATIENT
Start: 2018-03-02

## 2018-03-06 NOTE — PROGRESS NOTES
Occupational Therapy Progress Update: Today's date: 3/8/2018  Patient name: Kera Enriquez  : 1950  MRN: [id number]  Referring provider: LUCIA Castellano  Dx:   Encounter Diagnosis   Name Primary?  Cerebrovascular accident (CVA), unspecified mechanism ([facility] [address] ) Yes     Subjective: "No more" (referring to discontinuation of therapy 2* dtr reports pt is moving back to [facility] and will be discontinuing therapy as of today, last session today 3/8/2018)    Objective: See treatment diary below    Assessment: Pt engaged in supine proximal hot pack neuro re-education w/ distal Bioness neuro re-education modulation w/ poor attention/concentration severely distractible despite max verbal concrete cues w/ [place]; trialed large red theraball and 2# yellow therabar w/ bioness on in supine though pt demonstrated poor form despite max cues and [place], downgraded to [facility] shoulder flexion/extension, horizontal abd/adduction  Pt required SBA for supine to sit @ EOB, engaged in 2" yellow therabar overhead shoulder flexion w/ max manual cues for thoracic, cervical extension to improve body mechanics for B/L UE overhead therex  Pt engaged in thumb and digit 1 isolation for refined pincer grasp/release trials of velcro cubes w/ LUE only to retrieve and place in B/L peripheral fields w/ mod difficulty, 25% droppage noted  Benefits from RUE to stabilize board to enable LUE to manipulate  Pt continues to demonstrate the following occupational deficits: limited 2* [place] Islander speaking only, minimally verbal, expressive>receptive aphasia, RUE/RLE hemiparesis, decreased RLE advancement, w/ associated buckling, RLE mixed tone proximal hypertonicity distal hypotonicity, RUE proximal hypertonicity distal hypotonicity w/ pronator drift, dysarthria, dysphagia, R facial droop, R lateral lean, poor STM/immediate delayed recall, oriented to self only disoriented to remainder,  poor RUE distal prehension, impaired FMC/FMS/GMC/GMS, fall risk, decreased endurance/activity tolerance, generalized weakness, deconditioning, decreased insight judgment safety and awareness into deficits  Tolerated treatment well  Patient would benefit from continued skilled OT      Plan: Pt to continue to benefit from skilled OT per POC with focus on improving RUE FMC/FMS/prehension patterns, functional use, scaption, and R sided awareness; however, per dtr, today 3/8/2018 will be his last treatment day for OT/PT/SLP 2* pt is moving back to [facility] therefore will D/C pt from OT caseload  D/C OT services, pt/dtr agreeable   Pt has made partial progress towards the following goals as identified per initial POC:  Goals:  Short Term Goals:  Tone: PARTIALLY MET  · Pt will demo with G understanding and carryover of tone reduction strategies for improved AROM 4 weeks  · Pt will demo with decreased RUE  hypertonicity for improved grasp release, termination of flexors on command  50% of time 4 weeks  · Pt will demo with decreased  shoulder subluxation to trace for pain free AROM for improved ADL and IADL engagement 4 weeks  · Pt will demo good carryover of clinic and home tone reduction strategies for improved AROM initiation with functional reach, dressing, hygiene 4 weeks  Modalities: PARTIALLY MET  · Pt will tolerate BIONESS for improved motor and sensory performance for overall improved hand to target with 80% accuracy 4 weeks  · Pt will increase compliance with  [facility] for improved LPS with  improved fit/decreased discomfort with wear time 4 weeks  Coordination: PARTIALLY MET  · Pt will increase automaticity of  RUE  to 30% for improved grasp release of tabletop items 4 weeks  · Pt will increase rate of manipulation for all FM tests for improved pad pinch 4 weeks  · Pt will increase prehension patterns for improved tripod with utensil management with <20% droppage 4 weeks  · Pt will increase proprioception of  RUE hand to target for improved functional reach vision occluded with ADL tasks 4 weeks  · Pt will increase rate of manipulation for all FM tests for improved functional performance with salient tasks 4 weeks  · Pt will increase automaticity of RUE  to 50% for improved grasp release of tabletop items for improved functional performance with salient tasks 4 weeks  ROM/Function: PARTIALLY MET  · Pt will increase RUE  to functional assist with <20% cuing for tabletop tasks 4 weeks  · Pt will increase RUE  UE to Gross Assist, refined assist, and stabilization with <20% cuing for tabletop tasks 4 weeks  · Pt will demo with G carryover of Home Exercise Program to improve functional progression towards goals in Plan of care and for improved functional use of  RUE 4 weeks  · Pt will increase RUE  to refined functional assist with <20% cuing for tabletop tasks for improved functional performance of life roles and salient tasks 4  Long Term Goals:  Tone:  PARTIALLY MET  · Decrease hypertonicity/Improve hypotonicity of  RUE  to WNL for automatic grasp/ release  and functional reach with grooming  · Decrease hypertonicity/improve hypotonicity of RUE  to WNL for improved alternate motor patterns, termination on command for improved I/ADL/leisure task engagement  · Pt will demo with decreased hypertonicity/improve hypotonicity of RUE to WNL for automatic grasp/ release  and functional reach  Modalities: PARTIALLY MET  · Tolerate RHS or Saebo Stretch x 6-8 hours for tendon elongation, decreased wrist drop and decreased tone in RUE hand  Coordination: PARTIALLY MET  · Decrease ataxia with functional reach for normalized movement pattern of  RUE  · Increase automaticity of  RUE to 100% for resumption of B integrative tasks  · Pt will increase rate of prehension for all FM tasks for improved use of utensils, writing, ADL fasteners  · Pt will increase prehension for all FM tasks for improved independence with I/ADL/leisure tasks including utensils, writing, ADL fasteners  ROM/Function: PARTIALLY MET  · Increase func mobs from various surfaces to Mod I/ I with least restrictive device and good safety  · Resume hand dominance to refined mod I functional assist with all I/ADL/leisure tasks  · Pt will increase B/L UE strength to 5/5 and  strength, through the use of strengthening exercises and home program for eventual return to driving PRN  · Pt will increase FMC to Mod I with ADL fasteners for increased independence  · Pt will resume hand dominance with I status for all activities of daily living and eventual return to driving PRN     INTERVENTION COMMENTS:  Diagnosis: L MCA CVA, vascular dementia  Precautions: PEG, fall risk, AD, aphasia, vascular dementia  FOTO: 12% with 88% limitation  Insurance: [facility]  Visit 4 of 8, D/C 3/8/2018      Thank you for the consult!   Please call if you have any questions: [phone]  Jonah Castellanos, OTD, OTR/L, C-GCM, CSRS  Director of Outpatient Neuro Occupational Therapy

## 2018-03-08 ENCOUNTER — OFFICE VISIT (OUTPATIENT)
Dept: PHYSICAL THERAPY | Facility: CLINIC | Age: 68
End: 2018-03-08
Payer: COMMERCIAL

## 2018-03-08 ENCOUNTER — OFFICE VISIT (OUTPATIENT)
Dept: OCCUPATIONAL THERAPY | Facility: CLINIC | Age: 68
End: 2018-03-08
Payer: COMMERCIAL

## 2018-03-08 ENCOUNTER — OFFICE VISIT (OUTPATIENT)
Dept: SPEECH THERAPY | Facility: CLINIC | Age: 68
End: 2018-03-08
Payer: COMMERCIAL

## 2018-03-08 DIAGNOSIS — I69.30 CHRONIC ISCHEMIC LEFT MCA STROKE: Primary | ICD-10-CM

## 2018-03-08 DIAGNOSIS — G81.91 RIGHT HEMIPARESIS (HCC): ICD-10-CM

## 2018-03-08 DIAGNOSIS — I69.320 APHASIA AS LATE EFFECT OF CEREBROVASCULAR ACCIDENT: Primary | ICD-10-CM

## 2018-03-08 DIAGNOSIS — I63.9 CEREBROVASCULAR ACCIDENT (CVA), UNSPECIFIED MECHANISM (HCC): Primary | ICD-10-CM

## 2018-03-08 PROCEDURE — G8986 CARRY D/C STATUS: HCPCS

## 2018-03-08 PROCEDURE — 97112 NEUROMUSCULAR REEDUCATION: CPT

## 2018-03-08 PROCEDURE — 97112 NEUROMUSCULAR REEDUCATION: CPT | Performed by: PHYSICAL THERAPIST

## 2018-03-08 PROCEDURE — G8985 CARRY GOAL STATUS: HCPCS

## 2018-03-08 PROCEDURE — G9164 LANG EXPRESS D/C STATUS: HCPCS

## 2018-03-08 PROCEDURE — G9163 LANG EXPRESS GOAL STATUS: HCPCS

## 2018-03-08 PROCEDURE — G8980 MOBILITY D/C STATUS: HCPCS | Performed by: PHYSICAL THERAPIST

## 2018-03-08 PROCEDURE — G8979 MOBILITY GOAL STATUS: HCPCS | Performed by: PHYSICAL THERAPIST

## 2018-03-08 PROCEDURE — 97110 THERAPEUTIC EXERCISES: CPT

## 2018-03-08 PROCEDURE — 92507 TX SP LANG VOICE COMM INDIV: CPT

## 2018-03-08 PROCEDURE — 97110 THERAPEUTIC EXERCISES: CPT | Performed by: PHYSICAL THERAPIST

## 2018-03-08 NOTE — PROGRESS NOTES
Daily Note     Today's date: 3/8/2018  Patient name: Marco Lynn  : 1950  MRN: [id number]  Referring provider: LUCIA Guerra  Dx:   Encounter Diagnoses   Name Primary?  Chronic ischemic left MCA stroke Yes    Right hemiparesis ([address] )                   Subjective: Per OT pt is moving back to [facility] and today is his last day in therapy  Pt did not voice/gesture any complaints during session      Objective: See treatment diary below  Precautions: Aphasia, understands only Vietnamese, falls    Daily Treatment Diary       Exercise Diary  2/15 2/22 2/27 3/8    nustep   12 min  Level 5  10 min     Step ups-6'' 15x ea with cane   10 x ea side  // bars 10 x ea bonnie  // bars Only RLE  2x10     Alt step taps-6''  10x  10x       Side stepping 1 lap  1 lap  3 laps Small // bars  3 laps    Stepping over hurdles   3 laps  small // bars  3 laps     Gait training-NBQC with tape marks for step length in solo no tape, verbal cues for increased step length 40 ft, 80 ft        STS 10x L foot on 4", B UE 10x L foot on   4"step  B UE 10x     Tandem gait      Small // bars  3 laps     HR/TR            TM            Gait training- no AD            Alt heel taps-6''       from foam  R/L x30     High knees    20 x  20 x      Gait training - // bars with L UE only    1 lap  3 laps                    high knee marches      2 laps                                                                 Flowsheet Rows    Flowsheet Row Most Recent Value   Tinetti   Sitting Balance  1   Arises  2   Attempts to Arise  2   Immediate Standing Balance (First 5 Seconds)  1   Standing Balance  1   Nudged  1   Eyes Closed  0   Turned 360 Degrees: Steadiness  0   Turned 360 Degrees: Continuity of Steps  0   Sitting Down  1   Balance Score  9   Initiation of Gait  0   Step Height: R Swing Foot  1   Step Length: R Swing Foot  0   Step Height: L Swing Foot  1   Step Length: L Swing Foot  0   Step Symmetry  1   Step Continuity  0   Path  1 Trunk  0   Walking Time  0   Gait Score  4   Total Score  13          Assessment: Pt showed some progress on Tinetti score however continues with gait deficits of decreased step length B and decreased speed  Goals not fully met due to unanticipated d/c of pt returning to [facility] with his family  Plan: Pt d/c'ed at this time

## 2018-03-08 NOTE — PROGRESS NOTES
Daily Speech Treatment Note  Discharge summary    Primary Diagnosis/Billing code: V46 329, I69 391  Secondary Diagnosis/ Billing code: I63 9    Visit Tracking:  -Visit #16  -CBC  ($30 co-pay - Cande Kenny)  -RE due 3/22/2018  Short-term goals:     Subjective/Behavioral: 1:1 ST x 50 minutes  Pt arrived to facility with daughter  He participated weill during session  Objective/Assessment:  HEP: Dtr reported pt was able to use Lingraphica device consistently throughout the day 4  days of the week  Dtr also reported they will be moving back to [facility] March 29th of 2018, she would like to discontinue therapy services as of today 3/8/2018  Dtr informed clinician she had not been making her co-pay, she wasn't unsure whether or not she had to  Clinician informed her she is supposed to make $30 payments every visit, She was provided with the current amount she owed as well as billing contact information, she indicated she would call and make payment  *pt's daughter indicated she and the pt want to obtain a device  dtr signed all necessary documentation and requested for the device to be mailed to her house in [facility] or to her son's house in [facility]  Goal 1  Patient will improve initiation of swallow trigger with use of gustatory/TTS to less then 5 secs ) (to be achieved in 4-6 weeks) GOAL DISCONTINUED FOLLOWING SWALLOW STUDY  Goal 2: (Patient will improve oral responsiveness/volitional movement of oral structures with use of gusttory/TTS in 50% of opps ) (to be achieved in 4-6 weeks) GOAL DISCONTINUED FOLLOWING SWALLOW STUDY    Goal 3: Patient will demonstrate functional use of AAC device across 2-4 therapy sessions with min cues provided  PARTIALLY MET    Clinician instructed pt to navigate through device and find specific icons  He required min-mod verbal cues and clinician assistance in 3/6 opp   Pt was asked questions related to activities of daily living, he was able to answer questions using device in 2/4 oppopp  He required mod verbal cues during task  Pt independently expressed he was feeling tired and wanted to sleep through the use of the device  Goal 4: Patient's caregiver will demonstrate ability to program AAC device - adding new icons, etc with 100% acc  -GOAL MET   Goal 5: (Patient will correctly identify a related phrase or sentence when given a picture in 80% of opps  ): (to be achieved in 4-6 weeks) GOAL MET  Goal 6: (Patient will write words correctly in 80% of opps when verbally presented ): (to be achieved in 4-6 weeks)-GOAL MET  Goal 7: (Patient will write the name of a visually presented object in 80% of opps ) (to be achieved in 4-6eks) GOAL MET   Goal 8: (Patient will match picture to word out of a field of 10 ) (to be achieved in 4-6 weeks) GOAL MET     Long-term goals:   1  Patient will trial Lingraphica AAC device for 30 days to improve expressive communication by discharge  GOAL MET    2  Patient will improve swallow function to moderate to improve potential for po and reduce the risk of aspiration, malnutrition and dehydration  GOAL WAS DISCONTINUED FOLLOWING MBS STUDY  3  Patient will improve ability to communicate wants/needs  Partially met           Discharge summary  Plan:    PT will be discharged per dtr's request  Pt will be moving back to [facility] on 3/29/2018  Pt has made steady progress towards achieving goals, he has not met all goals  Dtr was educated on strategies and carry over in the home setting       Flowsheet Rows    Flowsheet Row Most Recent Value   SLP G-Codes   Assessment Type  Discharge   Functional Limitations  Spoken language expressive   Spoken Language Expression Goal Status ()  CL   Spoken Language Expression Discharge Status ()  CK

## 2018-03-13 ENCOUNTER — TELEPHONE (OUTPATIENT)
Dept: INTERNAL MEDICINE CLINIC | Facility: CLINIC | Age: 68
End: 2018-03-13

## 2018-03-13 ENCOUNTER — APPOINTMENT (OUTPATIENT)
Dept: OCCUPATIONAL THERAPY | Facility: CLINIC | Age: 68
End: 2018-03-13
Payer: COMMERCIAL

## 2018-03-13 ENCOUNTER — APPOINTMENT (OUTPATIENT)
Dept: PHYSICAL THERAPY | Facility: CLINIC | Age: 68
End: 2018-03-13
Payer: COMMERCIAL

## 2018-03-13 ENCOUNTER — APPOINTMENT (OUTPATIENT)
Dept: SPEECH THERAPY | Facility: CLINIC | Age: 68
End: 2018-03-13
Payer: COMMERCIAL

## 2018-03-13 NOTE — TELEPHONE ENCOUNTER
I called patient's daughter and she will call tomorrow with a list of meds that this patient needs  Thanks   Wen Tejeda

## 2018-03-13 NOTE — TELEPHONE ENCOUNTER
Daughter called  She picked up medications at the pharmacy  But they aren't the right ones  Can someone please call to go over correct medications for this patient? I did confirm with [facility] pharmacy  They did not receive medication sent in 3/2/18  Patient is out of Memantine 10 mg and Dilitiazem 300 mg  Please let daughter know what is going on

## 2018-03-15 ENCOUNTER — APPOINTMENT (OUTPATIENT)
Dept: OCCUPATIONAL THERAPY | Facility: CLINIC | Age: 68
End: 2018-03-15
Payer: COMMERCIAL

## 2018-03-15 ENCOUNTER — OFFICE VISIT (OUTPATIENT)
Dept: SPEECH THERAPY | Facility: CLINIC | Age: 68
End: 2018-03-15
Payer: COMMERCIAL

## 2018-03-15 ENCOUNTER — OFFICE VISIT (OUTPATIENT)
Dept: PHYSICAL THERAPY | Facility: CLINIC | Age: 68
End: 2018-03-15
Payer: COMMERCIAL

## 2018-03-20 ENCOUNTER — APPOINTMENT (OUTPATIENT)
Dept: PHYSICAL THERAPY | Facility: CLINIC | Age: 68
End: 2018-03-20
Payer: COMMERCIAL

## 2018-03-20 ENCOUNTER — APPOINTMENT (OUTPATIENT)
Dept: SPEECH THERAPY | Facility: CLINIC | Age: 68
End: 2018-03-20
Payer: COMMERCIAL

## 2018-03-20 ENCOUNTER — APPOINTMENT (OUTPATIENT)
Dept: OCCUPATIONAL THERAPY | Facility: CLINIC | Age: 68
End: 2018-03-20
Payer: COMMERCIAL

## 2018-03-20 NOTE — TELEPHONE ENCOUNTER
Informed pt's daughter in law, Thalia Edgar that Tatiana Paige called in meds to pharmacy  Thalia Edgar st she will  meds

## 2018-03-20 NOTE — TELEPHONE ENCOUNTER
I have phone prescribed these meds (diltiazem 300 mg daily, #90 R=1, and memantine 10 mg daily #90 R=1) as previous e-prescriptions did not go through  Please inform patient to pick them up  Thanks   Ron Cook

## 2018-03-22 ENCOUNTER — APPOINTMENT (OUTPATIENT)
Dept: PHYSICAL THERAPY | Facility: CLINIC | Age: 68
End: 2018-03-22
Payer: COMMERCIAL

## 2018-03-22 ENCOUNTER — APPOINTMENT (OUTPATIENT)
Dept: OCCUPATIONAL THERAPY | Facility: CLINIC | Age: 68
End: 2018-03-22
Payer: COMMERCIAL

## 2018-03-22 ENCOUNTER — APPOINTMENT (OUTPATIENT)
Dept: SPEECH THERAPY | Facility: CLINIC | Age: 68
End: 2018-03-22
Payer: COMMERCIAL

## 2018-03-27 ENCOUNTER — APPOINTMENT (OUTPATIENT)
Dept: SPEECH THERAPY | Facility: CLINIC | Age: 68
End: 2018-03-27
Payer: COMMERCIAL

## 2018-03-27 ENCOUNTER — APPOINTMENT (OUTPATIENT)
Dept: PHYSICAL THERAPY | Facility: CLINIC | Age: 68
End: 2018-03-27
Payer: COMMERCIAL

## 2018-03-27 ENCOUNTER — APPOINTMENT (OUTPATIENT)
Dept: OCCUPATIONAL THERAPY | Facility: CLINIC | Age: 68
End: 2018-03-27
Payer: COMMERCIAL

## 2018-03-28 ENCOUNTER — TELEPHONE (OUTPATIENT)
Dept: INTERNAL MEDICINE CLINIC | Facility: CLINIC | Age: 68
End: 2018-03-28

## 2018-03-28 NOTE — TELEPHONE ENCOUNTER
Please take this form to 1 of the attendings  This is for services and durable medical goods and I cannot sign this form  The form will remain in my been until someone can take it to one of the attendings    Thank you

## 2018-03-29 ENCOUNTER — APPOINTMENT (OUTPATIENT)
Dept: OCCUPATIONAL THERAPY | Facility: CLINIC | Age: 68
End: 2018-03-29
Payer: COMMERCIAL

## 2018-03-29 ENCOUNTER — APPOINTMENT (OUTPATIENT)
Dept: PHYSICAL THERAPY | Facility: CLINIC | Age: 68
End: 2018-03-29
Payer: COMMERCIAL

## 2018-03-29 ENCOUNTER — APPOINTMENT (OUTPATIENT)
Dept: SPEECH THERAPY | Facility: CLINIC | Age: 68
End: 2018-03-29
Payer: COMMERCIAL

## 2018-03-29 NOTE — TELEPHONE ENCOUNTER
Took form from [facility]  Had Dr Wen Wakefield sign it and faxed form with last two office visit notes to [phone]  Confirmation Received

## 2018-04-16 NOTE — TELEPHONE ENCOUNTER
I also received a Preauthorization letter of Medical necessity that needs to be signed by an attending I will ask Dr Gifford Dancer to sign since Dr Norma Mcfadden will not be in the office this week  Once its signed I will fax it to the number on the form

## 2018-05-09 ENCOUNTER — TELEPHONE (OUTPATIENT)
Dept: NEUROLOGY | Facility: CLINIC | Age: 68
End: 2018-05-09

## 2018-05-09 NOTE — LETTER
Account: [de-identified]  DOS: 18  CPT: 64470  Patient: Malik Alarcon  : 1950    Please be advised Jayna Reinoso had a swallow study completed (CPT 56225) on 18 due to both Dysphagia post his CVA (ICD-10: I69 391) and Aphasia post CVA (ICD-10: I69 320)  Please reprocess his claim for payment of these benefits as this testing was medically necessary  For any further questions please contact our office at [phone]      Sincerely,    FADIA Xavier

## 2018-05-09 NOTE — TELEPHONE ENCOUNTER
Nasir montoya, anyway we can change the diagnosis code to dysphagia post CVA now? Yes that would be great! Thanks for taking care of this!

## 2018-05-09 NOTE — TELEPHONE ENCOUNTER
We received a fax from billing stating pt swallow study preformed 2/26/18 is being denied due to invalid dx code  I believe this is because the type of dysphagia was not specified  I am going to generate a letter asking them to update codes to include I69 391- dysphagia post CVA and I69 320 for aphasia post CVA  Please review and advise if ok to submit to billing      Billing fax# [phone]